# Patient Record
Sex: MALE | HISPANIC OR LATINO | Employment: FULL TIME | URBAN - METROPOLITAN AREA
[De-identification: names, ages, dates, MRNs, and addresses within clinical notes are randomized per-mention and may not be internally consistent; named-entity substitution may affect disease eponyms.]

---

## 2017-01-04 ENCOUNTER — OFFICE VISIT (OUTPATIENT)
Dept: MEDICAL GROUP | Facility: PHYSICIAN GROUP | Age: 62
End: 2017-01-04
Payer: COMMERCIAL

## 2017-01-04 VITALS
WEIGHT: 204.6 LBS | HEART RATE: 113 BPM | SYSTOLIC BLOOD PRESSURE: 112 MMHG | BODY MASS INDEX: 36.25 KG/M2 | RESPIRATION RATE: 14 BRPM | DIASTOLIC BLOOD PRESSURE: 78 MMHG | HEIGHT: 63 IN | TEMPERATURE: 97.7 F | OXYGEN SATURATION: 94 %

## 2017-01-04 DIAGNOSIS — Z79.4 TYPE 2 DIABETES MELLITUS WITH HYPEROSMOLAR COMA, WITH LONG-TERM CURRENT USE OF INSULIN (HCC): ICD-10-CM

## 2017-01-04 DIAGNOSIS — E78.00 PURE HYPERCHOLESTEROLEMIA: ICD-10-CM

## 2017-01-04 DIAGNOSIS — I15.2 HYPERTENSION ASSOCIATED WITH DIABETES (HCC): ICD-10-CM

## 2017-01-04 DIAGNOSIS — E11.01 TYPE 2 DIABETES MELLITUS WITH HYPEROSMOLAR COMA, WITH LONG-TERM CURRENT USE OF INSULIN (HCC): ICD-10-CM

## 2017-01-04 DIAGNOSIS — E66.9 OBESITY (BMI 35.0-39.9 WITHOUT COMORBIDITY): ICD-10-CM

## 2017-01-04 DIAGNOSIS — E11.59 HYPERTENSION ASSOCIATED WITH DIABETES (HCC): ICD-10-CM

## 2017-01-04 PROCEDURE — 99214 OFFICE O/P EST MOD 30 MIN: CPT | Performed by: NURSE PRACTITIONER

## 2017-01-04 RX ORDER — INSULIN GLARGINE 100 [IU]/ML
25 INJECTION, SOLUTION SUBCUTANEOUS EVERY EVENING
Qty: 10 ML | Refills: 2 | Status: SHIPPED | OUTPATIENT
Start: 2017-01-04 | End: 2017-04-15 | Stop reason: SDUPTHER

## 2017-01-04 NOTE — Clinical Note
[facility]  STORMY Navarrete  [address] [address]  [place] [address]  Fax: [phone] Authorization for Release/Disclosure of Protected Health Information   Name: WILLIS RAE : 1956 SSN: [id number]   Address: [address] [place] [address]  [place] [address] Phone:    [phone] (home)    I authorize the entity listed below to release/disclose the PHI below to [facility]/STORMY Navarrete   Provider or Entity Name: [facility]        Address   City, [facility]   Phone:      Fax: [phone]     Reason for request: continuity of care   Information to be released:    [ * ] LAST COLONOSCOPY, including any PATH REPORT [  ] LAST DEXA  [  ] LAST MAMMOGRAM  [  ] LAST PAP [  ] RETINA EXAM REPORT  [  ] IMMUNIZATION RECORDS  [  ] Release all info      [  ] Check here and initial the line next to each item to release ALL health information INCLUDING  _____ Care and treatment for drug and / or alcohol abuse  _____ HIV testing, infection status, or AIDS  _____ Genetic Testing    DATES OF SERVICE OR TIME PERIOD TO BE DISCLOSED: _Last Colonoscopy______  I understand and acknowledge that:  * This Authorization may be revoked at any time by you in writing, except if your health information has already been used or disclosed.  * Your health information that will be used or disclosed as a result of you signing this authorization could be re-disclosed by the recipient. If this occurs, your re-disclosed health information may no longer be protected by State or Federal laws.  * You may refuse to sign this Authorization. Your refusal will not affect your ability to obtain treatment.  * This Authorization becomes effective upon signing and will  on (date) __________. If no date is indicated, this Authorization will  one (1) year from the signature date.    Name: Willis Rae Date: 2017

## 2017-01-04 NOTE — MR AVS SNAPSHOT
"Jose Antonio Vann   2017 4:00 PM   Office Visit   MRN: [id number]    Department:  [facility]   Dept Phone:  [phone]    Description:  Male : 1956   Provider:  STORMY Navarrete           Reason for Visit     Follow-Up 3 month       Allergies as of 2017     No Known Allergies      You were diagnosed with     Type 2 diabetes mellitus with hyperosmolar coma, with long-term current use of insulin ([facility])   [9470453]       Obesity (BMI 35.0-39.9 without comorbidity) ([facility])   [120219]         Vital Signs     Blood Pressure Pulse Temperature Respirations Height Weight    112/78 mmHg 113 36.5 °C (97.7 °F) 14 1.6 m (5' 2.99\") 92.806 kg (204 lb 9.6 oz)    Body Mass Index Oxygen Saturation Smoking Status             36.25 kg/m2 94% Never Smoker          Basic Information     Date Of Birth Sex Race Ethnicity Preferred Language    1956 Male , Unable to Obtain  Origin (Latvian,Haitian,Monegasque,Burmese, etc) English      Your appointments     2017  5:00 PM   Established Patient with STORMY Navarrete   [address] [id number] [phone]           You will be receiving a confirmation call a few days before your appointment from our automated call confirmation system.              Problem List              ICD-10-CM Priority Class Noted - Resolved    Type II diabetes mellitus (HCC) E11.9   2016 - Present    Pure hypercholesterolemia E78.00   2016 - Present    Hypertension associated with diabetes (HCC) E11.59, I10   2016 - Present    Obesity (BMI 35.0-39.9 without comorbidity) ([facility]) E66.9   2017 - Present      Health Maintenance        Date Due Completion Dates    A1C SCREENING 1956 ---    DIABETES MONOFILAMTENT / LE EXAM 1954 ---    RETINAL SCREENING 1972 ---    FASTING LIPID PROFILE 1972 ---    URINE ACR / MICROALBUMIN 1972 ---    SERUM " CREATININE 1/6/1972 ---    IMM DTaP/Tdap/Td Vaccine (1 - Tdap) 1/6/1973 ---    IMM PNEUMOCOCCAL 19-64 (ADULT) MEDIUM RISK SERIES (1 of 1 - PPSV23) 1/6/1973 ---    IMM ZOSTER VACCINE 1/6/2014 ---    IMM INFLUENZA (1) 9/1/2016 ---            Current Immunizations     No immunizations on file.      Below and/or attached are the medications your provider expects you to take. Review all of your home medications and newly ordered medications with your provider and/or pharmacist. Follow medication instructions as directed by your provider and/or pharmacist. Please keep your medication list with you and share with your provider. Update the information when medications are discontinued, doses are changed, or new medications (including over-the-counter products) are added; and carry medication information at all times in the event of emergency situations     Allergies:  No Known Allergies          Medications  Valid as of: January 04, 2017 -  4:26 PM    Generic Name Brand Name Tablet Size Instructions for use    GlyBURIDE (Tab) DIABETA 5 MG Take 2 Tabs by mouth 2 times a day, with meals.        Insulin Aspart (Solution Pen-injector) NOVOLOG 100 UNIT/ML Inject  as instructed 3 times a day before meals.        Insulin Aspart (Solution Pen-injector) NOVOLOG 100 UNIT/ML 4-6 units according to sliding scale        Insulin Detemir (Solution Pen-injector) LEVEMIR 100 UNIT/ML Inject  as instructed every evening.        Insulin Detemir (Solution Pen-injector) LEVEMIR 100 UNIT/ML Inject 30 Units as instructed 2 times a day.        Insulin Glargine (Solution) LANTUS 100 UNIT/ML Inject 25 Units as instructed every evening.        Linagliptin-Metformin HCl (Tab) Linagliptin-Metformin HCl 2.5-1000 MG Take 1 tablet by mouth 2 Times a Day.        Lisinopril (Tab) PRINIVIL 10 MG Take 10 mg by mouth every day.        .                 Medicines prescribed today were sent to:     [id number] - [place], [place] - [address] [address]    [address] [place] [address]    Phone: [phone] Fax: [phone]    Open 24 Hours?: No      Medication refill instructions:       If your prescription bottle indicates you have medication refills left, it is not necessary to call your provider’s office. Please contact your pharmacy and they will refill your medication.    If your prescription bottle indicates you do not have any refills left, you may request refills at any time through one of the following ways: The online [employer] system (except Urgent Care), by calling your provider’s office, or by asking your pharmacy to contact your provider’s office with a refill request. Medication refills are processed only during regular business hours and may not be available until the next business day. Your provider may request additional information or to have a follow-up visit with you prior to refilling your medication.   *Please Note: Medication refills are assigned a new Rx number when refilled electronically. Your pharmacy may indicate that no refills were authorized even though a new prescription for the same medication is available at the pharmacy. Please request the medicine by name with the pharmacy before contacting your provider for a refill.        Your To Do List     Future Labs/Procedures Complete By Expires    COMP METABOLIC PANEL (For Serum Creatinine Topic, choose 1 only)  As directed 1/4/2018    HEMOGLOBIN A1C (For A1C Every 6 Months Topic)  As directed 1/4/2018    Comments:    HEMOGLOBIN A1C   [unfilled]    LIPID PROFILE  As directed 1/4/2018    MICROALBUMIN CREAT RATIO URINE  As directed 1/4/2018         [employer] Access Code: [email or web address]  Expires: 2/3/2017  4:26 PM    [employer]  A secure, online tool to manage your health information     The Mobile [employer]® is a secure, online tool that connects you to your personalized health information from the privacy of your home -- day or night - making it very easy for you to manage your  healthcare. Once the activation process is completed, you can even access your medical information using the [employer] ritika, which is available for free in the [employer] R[employer]ika store or Google Play store.     [employer] provides the following levels of access (as shown below):   My Chart Features   [facility] Doctor [facility]  Specialists [facility]  Urgent  Care Non-[facility]  Primary Care  Doctor   Email your healthcare team securely and privately 24/7 X X X    Manage appointments: schedule your next appointment; view details of past/upcoming appointments X      Request prescription refills. X      View recent personal medical records, including lab and immunizations X X X X   View health record, including health history, allergies, medications X X X X   Read reports about your outpatient visits, procedures, consult and ER notes X X X X   See your discharge summary, which is a recap of your hospital and/or ER visit that includes your diagnosis, lab results, and care plan. X X       How to register for [employer]:  1. Go to  [email or web address].  2. Click on the Sign Up Now box, which takes you to the New Member Sign Up page. You will need to provide the following information:  a. Enter your [employer] Access Code exactly as it appears at the top of this page. (You will not need to use this code after you’ve completed the sign-up process. If you do not sign up before the expiration date, you must request a new code.)   b. Enter your date of birth.   c. Enter your home email address.   d. Click Submit, and follow the next screen’s instructions.  3. Create a Enviroo ID. This will be your En[employer] login ID and cannot be changed, so think of one that is secure and easy to remember.  4. Create a [employer] password. You can change your password at any time.  5. Enter your Password Reset Question and Answer. This can be used at a later time if you forget your password.   6. Enter your e-mail address. This allows you to receive e-mail  notifications when new information is available in "[employer].  7. Click Sign Up. You can now view your health information.    For assistance activating your [employer] account, call [phone]

## 2017-01-05 NOTE — ASSESSMENT & PLAN NOTE
Chronic Condition: Patient had type 2 diabetes mellitus for 9 years. He checks blood sugar at home in the morning and at each meal and it averages more than 200. He is currently not taking Levemir because it was too expensive. He is only managing his diabetes with NovoLog and Jentadueto. He denies any tremors, anxiety, weakness, headache, dizziness, blurry vision, fatigue, irritability or palpitations. Patient denies any drowsiness, dry skin, polyphagia or polydipsia, polyuria or nausea.

## 2017-01-05 NOTE — PROGRESS NOTES
Subjective:     Chief Complaint   Patient presents with   • Follow-Up     3 month        HPI:  Jose Antonio Vann is a 60 y.o. male here today to discuss the following:    Hypertension associated with diabetes (HCC)  Chronic condition: Patient has been treated for hypertension for approximately 8 years.He  is currently taking lisinopril 10 mg daily without any side effects and blood pressures well controlled. He denies any chest pain, diaphoresis, shortness of breath, headaches, dizziness or blurred vision.         Obesity (BMI 35.0-39.9 without comorbidity) (HCC)  Patient's obesity with a BMI of 36.25. We discussed making dietary modifications to help him decrease his weight and improve diabetes.    Pure hypercholesterolemia  Chronic Condition: Patient has hyperlipidemia and is currently taking not taking medication. He states that he was told to stop it by previous doctor. We do not have labs available to assess his values, and labs will be ordered to be reviewed at his appointment in 4 weeks. He denies any chest pain, diaphoresis, shortness of breath, headaches, dizziness, blurred vision or myalgias.     Type II diabetes mellitus (HCC)  Chronic Condition: Patient had type 2 diabetes mellitus for 9 years. He checks blood sugar at home in the morning and at each meal and it averages more than 200. He is currently not taking Levemir because it was too expensive. He is only managing his diabetes with NovoLog and Jentadueto. He denies any tremors, anxiety, weakness, headache, dizziness, blurry vision, fatigue, irritability or palpitations. Patient denies any drowsiness, dry skin, polyphagia or polydipsia, polyuria or nausea.                Current medicines (including changes today)  Current Outpatient Prescriptions   Medication Sig Dispense Refill   • insulin glargine (LANTUS) 100 UNIT/ML Solution Inject 25 Units as instructed every evening. 10 mL 2   • insulin detemir (LEVEMIR FLEXTOUCH) 100 UNIT/ML Solution  "Pen-injector injection Inject  as instructed every evening.     • NOVOLOG, insulin aspart, (NOVOLOG FLEXPEN) 100 UNIT/ML Solution Pen-injector injection Inject  as instructed 3 times a day before meals.     • lisinopril (PRINIVIL) 10 MG Tab Take 10 mg by mouth every day.     • insulin detemir (LEVEMIR FLEXTOUCH) 100 UNIT/ML Solution Pen-injector injection Inject 30 Units as instructed 2 times a day. 15 PEN 1   • NOVOLOG, insulin aspart, (NOVOLOG FLEXPEN) 100 UNIT/ML Solution Pen-injector injection 4-6 units according to sliding scale 15 PEN 1   • Linagliptin-Metformin HCl (JENTADUETO) 2.5-1000 MG Tab Take 1 tablet by mouth 2 Times a Day. 60 Tab 2   • glyBURIDE (DIABETA) 5 MG Tab Take 2 Tabs by mouth 2 times a day, with meals. 60 Tab 2     No current facility-administered medications for this visit.       He  has no past medical history on file.    ROS   Review of Systems   Constitutional: Negative for fever, chills, weight loss and malaise/fatigue.   HENT: Negative for ear pain, nosebleeds, congestion, sore throat and neck pain.    Respiratory: Negative for cough, sputum production, shortness of breath and wheezing.    Cardiovascular: Negative for chest pain, palpitations,  and leg swelling.   Gastrointestinal: Negative for heartburn, nausea, vomiting, diarrhea and abdominal pain.   Neurological: Negative for dizziness, tingling, tremors, sensory change, focal weakness and headaches.   Psychiatric/Behavioral: Negative for depression, anxiety, suicidal ideas, insomnia and memory loss.    All other systems reviewed and are negative except as in HPI.     Objective:   Physical Exam:  Blood pressure 112/78, pulse 113, temperature 36.5 °C (97.7 °F), resp. rate 14, height 1.6 m (5' 2.99\"), weight 92.806 kg (204 lb 9.6 oz), SpO2 94 %. Body mass index is 36.25 kg/(m^2).  Physical Exam:  Alert, oriented in no acute distress.  Eye contact is good, speech goal directed, affect calm  HEENT: conjunctiva non-injected, sclera " non-icteric.  Pinna normal.   Neck No adenopathy or masses in the neck or supraclavicular regions.  Lungs: clear to auscultation bilaterally with good excursion.  CV: regular rate and rhythm.   Abdomen: soft, nontender, No CVAT. Normal bowel sounds.  Ext: no edema, color normal, vascularity normal, temperature normal  MS: Normal gait and station    Assessment and Plan:   The following treatment plan was discussed   1. Type 2 diabetes mellitus with hyperosmolar coma, with long-term current use of insulin ([facility])  insulin glargine (LANTUS) 100 UNIT/ML Solution    HEMOGLOBIN A1C (For A1C Every 6 Months Topic)    LIPID PROFILE    COMP METABOLIC PANEL (For Serum Creatinine Topic, choose 1 only)    MICROALBUMIN CREAT RATIO URINE    Diabetic Monofilament Lower Extremity Exam   2. Obesity (BMI 35.0-39.9 without comorbidity) ([facility])  Patient identified as having weight management issue.  Appropriate orders and counseling given.   3. Hypertension associated with diabetes ([facility])     4. Pure hypercholesterolemia      insulin was changed to Lantus and patient was advised to take 25 units at bedtime. He was asked to bring a copy of his current sliding scale Office visit. Labs were ordered and we will reevaluate in 4 weeks.    Followup: Return in about 4 weeks (around 2/1/2017) for DM.   Please note that this dictation was created using voice recognition software. I have made every reasonable attempt to correct obvious errors, but I expect that there are errors of grammar and possibly content that I did not discover before finalizing the note.

## 2017-01-05 NOTE — ASSESSMENT & PLAN NOTE
Patient's obesity with a BMI of 36.25. We discussed making dietary modifications to help him decrease his weight and improve diabetes.

## 2017-01-05 NOTE — ASSESSMENT & PLAN NOTE
Chronic Condition: Patient has hyperlipidemia and is currently taking not taking medication. He states that he was told to stop it by previous doctor. We do not have labs available to assess his values, and labs will be ordered to be reviewed at his appointment in 4 weeks. He denies any chest pain, diaphoresis, shortness of breath, headaches, dizziness, blurred vision or myalgias.

## 2017-01-05 NOTE — ASSESSMENT & PLAN NOTE
Chronic condition: Patient has been treated for hypertension for approximately 8 years.He  is currently taking lisinopril 10 mg daily without any side effects and blood pressures well controlled. He denies any chest pain, diaphoresis, shortness of breath, headaches, dizziness or blurred vision.

## 2017-01-11 ENCOUNTER — TELEPHONE (OUTPATIENT)
Dept: MEDICAL GROUP | Facility: PHYSICIAN GROUP | Age: 62
End: 2017-01-11

## 2017-01-11 NOTE — TELEPHONE ENCOUNTER
1. Caller Name: Tonia (dtr)                                         Call Back Number: [phone] (home)         Patient approves a detailed voicemail message: N\A    Pt's daughter said pt was put on lantus but was told to let you know if his insurance wouldn't pay and it would be switched to something else.  She is requesting switch as insurance has denied.  Thank you

## 2017-01-13 NOTE — TELEPHONE ENCOUNTER
1. Caller Name: Shereen/[facility]                                         Call Back Number: [phone]      Patient approves a detailed voicemail message: N\A    Shereen called back and stated the [facility] pharmacy had a glitch in their system that they are trying to fix and that lantus is actually covered.   They are working to fix the glitch

## 2017-01-21 ENCOUNTER — HOSPITAL ENCOUNTER (OUTPATIENT)
Dept: LAB | Facility: MEDICAL CENTER | Age: 62
End: 2017-01-21
Attending: NURSE PRACTITIONER
Payer: COMMERCIAL

## 2017-01-28 ENCOUNTER — HOSPITAL ENCOUNTER (OUTPATIENT)
Dept: LAB | Facility: MEDICAL CENTER | Age: 62
End: 2017-01-28
Attending: NURSE PRACTITIONER
Payer: COMMERCIAL

## 2017-01-28 DIAGNOSIS — E11.00 TYPE 2 DIABETES MELLITUS WITH HYPEROSMOLARITY WITHOUT COMA, WITH LONG-TERM CURRENT USE OF INSULIN (HCC): ICD-10-CM

## 2017-01-28 DIAGNOSIS — Z79.4 TYPE 2 DIABETES MELLITUS WITH HYPEROSMOLARITY WITHOUT COMA, WITH LONG-TERM CURRENT USE OF INSULIN (HCC): ICD-10-CM

## 2017-01-28 LAB
ALBUMIN SERPL BCP-MCNC: 4.4 G/DL (ref 3.2–4.9)
ALBUMIN/GLOB SERPL: 1.5 G/DL
ALP SERPL-CCNC: 77 U/L (ref 30–99)
ALT SERPL-CCNC: 81 U/L (ref 2–50)
ANION GAP SERPL CALC-SCNC: 6 MMOL/L (ref 0–11.9)
AST SERPL-CCNC: 49 U/L (ref 12–45)
BILIRUB SERPL-MCNC: 0.7 MG/DL (ref 0.1–1.5)
BUN SERPL-MCNC: 12 MG/DL (ref 8–22)
CALCIUM SERPL-MCNC: 9.3 MG/DL (ref 8.5–10.5)
CHLORIDE SERPL-SCNC: 103 MMOL/L (ref 96–112)
CHOLEST SERPL-MCNC: 242 MG/DL (ref 100–199)
CO2 SERPL-SCNC: 28 MMOL/L (ref 20–33)
CREAT SERPL-MCNC: 0.84 MG/DL (ref 0.5–1.4)
CREAT UR-MCNC: 198.3 MG/DL
EST. AVERAGE GLUCOSE BLD GHB EST-MCNC: 232 MG/DL
GLOBULIN SER CALC-MCNC: 2.9 G/DL (ref 1.9–3.5)
GLUCOSE SERPL-MCNC: 160 MG/DL (ref 65–99)
HBA1C MFR BLD: 9.7 % (ref 0–5.6)
HDLC SERPL-MCNC: 40 MG/DL
LDLC SERPL CALC-MCNC: 153 MG/DL
MICROALBUMIN UR-MCNC: 23.1 MG/DL
MICROALBUMIN/CREAT UR: 116 MG/G (ref 0–30)
POTASSIUM SERPL-SCNC: 3.9 MMOL/L (ref 3.6–5.5)
PROT SERPL-MCNC: 7.3 G/DL (ref 6–8.2)
SODIUM SERPL-SCNC: 137 MMOL/L (ref 135–145)
TRIGL SERPL-MCNC: 245 MG/DL (ref 0–149)

## 2017-01-28 PROCEDURE — 80053 COMPREHEN METABOLIC PANEL: CPT

## 2017-01-28 PROCEDURE — 82570 ASSAY OF URINE CREATININE: CPT

## 2017-01-28 PROCEDURE — 36415 COLL VENOUS BLD VENIPUNCTURE: CPT

## 2017-01-28 PROCEDURE — 83036 HEMOGLOBIN GLYCOSYLATED A1C: CPT

## 2017-01-28 PROCEDURE — 80061 LIPID PANEL: CPT

## 2017-01-28 PROCEDURE — 82043 UR ALBUMIN QUANTITATIVE: CPT

## 2017-02-01 ENCOUNTER — OFFICE VISIT (OUTPATIENT)
Dept: MEDICAL GROUP | Facility: PHYSICIAN GROUP | Age: 62
End: 2017-02-01
Payer: COMMERCIAL

## 2017-02-01 VITALS
SYSTOLIC BLOOD PRESSURE: 130 MMHG | WEIGHT: 202.6 LBS | TEMPERATURE: 98.4 F | OXYGEN SATURATION: 97 % | BODY MASS INDEX: 35.9 KG/M2 | HEIGHT: 63 IN | DIASTOLIC BLOOD PRESSURE: 84 MMHG | HEART RATE: 81 BPM | RESPIRATION RATE: 16 BRPM

## 2017-02-01 DIAGNOSIS — E11.00 TYPE 2 DIABETES MELLITUS WITH HYPEROSMOLARITY WITHOUT COMA, WITHOUT LONG-TERM CURRENT USE OF INSULIN (HCC): ICD-10-CM

## 2017-02-01 PROCEDURE — 99214 OFFICE O/P EST MOD 30 MIN: CPT | Performed by: NURSE PRACTITIONER

## 2017-02-01 RX ORDER — GLYBURIDE 5 MG/1
10 TABLET ORAL 2 TIMES DAILY WITH MEALS
Qty: 180 TAB | Refills: 2 | Status: SHIPPED | OUTPATIENT
Start: 2017-02-01 | End: 2017-05-23 | Stop reason: SDUPTHER

## 2017-02-02 ENCOUNTER — TELEPHONE (OUTPATIENT)
Dept: MEDICAL GROUP | Facility: PHYSICIAN GROUP | Age: 62
End: 2017-02-02

## 2017-02-02 NOTE — ASSESSMENT & PLAN NOTE
Patient is here to follow-up on diabetes. He is currently taking novolog 12 bid, lantus 25, Glyburide 5mg twice, and Jentadueto  2.5 - 1000 BID. He takes his blood sugar at home at it is above 200.   He denies any tremors, anxiety, weakness, headache, dizziness, blurry vision, fatigue, irritability or palpitations. Patient denies any drowsiness, dry skin, polyphagia or polydipsia, polyuria or nausea.

## 2017-02-02 NOTE — PATIENT INSTRUCTIONS
Glyburide tablets  ¿Qué es marshal medicamento?  La GLIBURIDA ayuda a tratar la diabetes tipo 2. El tratamiento se combina con blaine dieta y ejercicios. Marshal medicamento ayuda a john cuerpo a usar la insulina de manera más eficiente.  Marshal medicamento puede ser utilizado para otros usos; si tiene alguna pregunta consulte con john proveedor de atención médica o con john farmacéutico.  MARCAS COMERCIALES DISPONIBLES: Diabeta, Glycron, Glynase PresTab, Micronase  ¿Qué le tasneem informar a mi profesional de la german antes de flaquita marshal medicamento?  Necesita saber si usted presenta alguno de los siguientes problemas o situaciones:  -cetoacidosis diabética  -deficiencia de glucosa -6- fosfato deshidrogenasa  -enfermedad cardiaca  -enfermedad renal  -enfermedad hepática  -infección o lesión severa  -enfermedad tiroidea  -blaine reacción alérgica o inusual a la gliburida, sulfonamidas, a otros medicamentos, alimentos, colorantes o conservantes  -si está embarazada o buscando quedar embarazada  -si está amamantando a un bebé  ¿Cómo tasneem utilizar marshal medicamento?  [place] marshal medicamento por vía oral con un vaso de agua. Siga las instrucciones de la etiqueta del medicamento. Si darius marshal medicamento blaine vez al día, tómelo con el desayuno o la primera comida del día. [place] john medicamento a la misma hora todos los días. No lo tome con blaine frecuencia mayor a la indicada.  Hable con john pediatra para informarse acerca del uso de marshal medicamento en niños. Puede requerir atención especial.  Los pacientes de más de 65 años de edad pueden tener reacciones más duc y necesitar blaine dosis clementine.  Sobredosis: Póngase en contacto inmediatamente con un centro toxicológico o blaine mahi de urgencia si usted rush que haya tomado demasiado medicamento.  ATENCIÓN: Marshal medicamento es solo para usted. No comparta marshal medicamento con nadie.  ¿Qué sucede si me olvido de blaine dosis?  Si olvida blaine dosis, tómela lo antes posible. Si es sue la hora de la próxima  dosis, tome sólo steve dosis. No tome dosis adicionales o dobles.  ¿Qué puede interactuar con marshal medicamento?  -bosentano  -cloranfenicol  -cisapride  -claritromicina  -medicamentos para las infecciones micóticas y por levadura  -metoclopramida  -probenecid  -rifampicina  -warfarina  Muchos medicamentos pueden aumentar o reducir el nivel de azúcar en la neema, tales parvez:  -bebidas alcohólicas  -inhibidores de la enzima convertidora de angiotensina, parvez enalapril, captopril y lisinopril  -aspirina y medicamentos tipo aspirina  -cloranfenicol  -cromo  -hormonas femeninas, parvez estrógenos, progestinas o píldoras anticonceptivas  -fluoxetina  -medicamentos cardiacos, parvez disopiramida  -isoniazida  -hormonas masculinas o esteroides anabólicos  -medicamentos denominados inhibidores de la MAO, tales parvez Nardil, Parnate, Marplan, Eldepryl  -medicamentos para alergias, asma, resfríos o tos  -medicamentos para problemas mentales  -medicamentos para bajar de peso  -niacina  -los REGGIE, medicamentos para el dolor o inflamación, parvez ibuprofeno o naproxeno  -pentamidina  -fenitoína  -probenecid  -antibióticos quinolónicos, parvez ciprofloxacina, levofloxacino, ofloxacino  -algunos suplementos dietéticos a base de hierbas  -medicamentos esteroideos, parvez la prednisona o la cortisona  -hormonas tiroideas  -diuréticos  Puede ser que esta lista no menciona todas las posibles interacciones. Informe a john profesional de la german de todos los productos a base de hierbas, medicamentos de venta pepe o suplementos nutritivos que esté tomando. Si usted fuma, consume bebidas alcohólicas o si utiliza drogas ilegales, indíqueselo también a john profesional de la german. Algunas sustancias pueden interactuar con john medicamento.  ¿A qué tasneem estar atento al usar marshal medicamento?  Visite a john médico o a john profesional de la german para chequear john evolución periódicamente. Aprenda a controlar periódicamente el nivel de azúcar en la neema.  Consulte a john médico o a john profesional de la german si john nivel de azúcar en la neema es alto, gage vez sea necesario modificar la dosis de john medicamento. Si está enfermo o haciendo mucho más ejercicio que el habitual, puede ser necesario modificar la dosis de john medicamento. No se salte comidas. Consulte a john médico o a john profesional de la german si debe evitar el consumo de alcohol. Si tiene síntomas leves de bajo nivel de azúcar en la neema, coma o david algo que contenga azúcar inmediatamente y comuníquese con john médico o con john profesional de la german. Asegúrese de que los miembros de jhon zoila sepan que se puede ahogar si come o marco mientras tiene síntomas graves de bajo nivel de azúcar en la neema, tales parvez convulsiones o pérdida del conocimiento. Deben obtener ayuda médica inmediatamente.  Marshal medicamento puede aumentar la sensibilidad al sol. Manténgase fuera rachel solar. Si no lo puede evitar utilice ropa protectora y crema de protección solar. No utilice lámparas scarlett, brenda scarlett ni cabinas scarlett.  Use blaine pulsera o myers de identificación médica que indique que tiene diabetes y lleve blaine tarjeta que indique todos tabby medicamentos.  ¿Qué efectos secundarios puedo tener al utilizar marshal medicamento?  Efectos secundarios que debe informar a john médico o a john profesional de la german tan pronto parvez sea posible:  -reacciones alérgicas parvez erupción cutánea, picazón o urticarias, hinchazón de la mary, labios o lengua  -problemas respiratorios  -orina de color amarillo oscuro  -fiebre, escalofríos, dolor de garganta  -bajo nivel de glucosa en la neema (pregunte a john médico o a john profesional de la german por blaine lista de estos síntomas)  -sangrado o magulladuras inusuales  -color amarillento de los ojos o la piel  Efectos secundarios que, por lo general, no requieren atención médica (debe informarlos a john médico o a john profesional de la german si persisten o si son molestos):  -diarrea  -dolor  de chula  -acidez de estómago  -náuseas, vómito  -molestias estomacales  Puede ser que esta lista no menciona todos los posibles efectos secundarios. Comuníquese a john médico por asesoramiento médico sobre los efectos secundarios. Myron puede informar los efectos secundarios a la FDA por teléfono al [phone]-[phone].  ¿Dónde tasneem guardar mi medicina?  Manténgala fuera del alcance de los niños.  Guárdela a temperatura ambiente, entre 15 y 30 grados C (59 y 86 grados F). Deseche todo el medicamento que no haya utilizado, después de la fecha de vencimiento.  ATENCIÓN: Tonya folleto es un resumen. Puede ser que no cubra toda la posible información. Si usted tiene preguntas acerca de esta medicina, consulte con john médico, john farmacéutico o john profesional de la german.  © 2014, [employer]. (9/3/2009 4:42:59 PM)

## 2017-02-02 NOTE — TELEPHONE ENCOUNTER
----- Message from Shelbie Samuel M.D. sent at 2/2/2017  8:24 AM PST -----  Patient needs follow up on labs in the next 2 weeks please.

## 2017-02-03 NOTE — PROGRESS NOTES
Subjective:     Chief Complaint   Patient presents with   • Follow-Up     4 weeks        HPI:  Jose Antonio Vann is a 61 y.o. male here today to discuss the following:    Type II diabetes mellitus (CMS-[facility])  Patient is here to follow-up on diabetes. He is currently taking novolog 12 bid, lantus 25, Glyburide 5mg twice, and Jentadueto  2.5 - 1000 BID. He takes his blood sugar at home at it is above 200.   He denies any tremors, anxiety, weakness, headache, dizziness, blurry vision, fatigue, irritability or palpitations. Patient denies any drowsiness, dry skin, polyphagia or polydipsia, polyuria or nausea.                    Current medicines (including changes today)  Current Outpatient Prescriptions   Medication Sig Dispense Refill   • glyBURIDE (DIABETA) 5 MG Tab Take 2 Tabs by mouth 2 times a day, with meals. 180 Tab 2   • insulin glargine (LANTUS) 100 UNIT/ML Solution Inject 25 Units as instructed every evening. 10 mL 2   • NOVOLOG, insulin aspart, (NOVOLOG FLEXPEN) 100 UNIT/ML Solution Pen-injector injection Inject  as instructed 3 times a day before meals.     • lisinopril (PRINIVIL) 10 MG Tab Take 10 mg by mouth every day.       No current facility-administered medications for this visit.       He  has no past medical history on file.    ROS   Review of Systems   Constitutional: Negative for fever, chills, weight loss and malaise/fatigue.   HENT: Negative for ear pain, nosebleeds, congestion, sore throat and neck pain.    Respiratory: Negative for cough, sputum production, shortness of breath and wheezing.    Cardiovascular: Negative for chest pain, palpitations,  and leg swelling.   Gastrointestinal: Negative for heartburn, nausea, vomiting, diarrhea and abdominal pain.   Neurological: Negative for dizziness, tingling, tremors, sensory change, focal weakness and headaches.   Psychiatric/Behavioral: Negative for depression, anxiety, suicidal ideas, insomnia and memory loss.    All other systems reviewed and are  "negative except as in HPI.     Objective:   Physical Exam:  Blood pressure 130/84, pulse 81, temperature 36.9 °C (98.4 °F), resp. rate 16, height 1.6 m (5' 2.99\"), weight 91.899 kg (202 lb 9.6 oz), SpO2 97 %. Body mass index is 35.9 kg/(m^2).   Physical Exam:  Alert, oriented in no acute distress.  Eye contact is good, speech goal directed, affect calm  HEENT: conjunctiva non-injected, sclera non-icteric.  Pinna normal.   Neck No adenopathy or masses in the neck or supraclavicular regions.  Lungs: clear to auscultation bilaterally with good excursion.  CV: regular rate and rhythm.   Abdomen: soft, nontender, No CVAT. Normal bowel sounds.  Ext: no edema, color normal, vascularity normal, temperature normal  MS: Normal gait and station    Assessment and Plan:   The following treatment plan was discussed   1. Type 2 diabetes mellitus with hyperosmolarity without coma, without long-term current use of insulin (HCC)  HEMOGLOBIN A1C (For A1C Every 6 Months Topic)    LIPID PROFILE    COMP METABOLIC PANEL (For Serum Creatinine Topic, choose 1 only)    MICROALBUMIN CREAT RATIO URINE    glyBURIDE (DIABETA) 5 MG Tab    Increase Lantus by 2 units to 27 at bedtime. If blood sugar remains above 200 may increase 2 units to 29 units at bedtime.        Followup: Return in about 3 months (around 5/1/2017) for DM.   Please note that this dictation was created using voice recognition software. I have made every reasonable attempt to correct obvious errors, but I expect that there are errors of grammar and possibly content that I did not discover before finalizing the note.             "

## 2017-03-20 ENCOUNTER — OFFICE VISIT (OUTPATIENT)
Dept: MEDICAL GROUP | Facility: PHYSICIAN GROUP | Age: 62
End: 2017-03-20
Payer: COMMERCIAL

## 2017-03-20 VITALS
HEIGHT: 63 IN | WEIGHT: 204 LBS | TEMPERATURE: 97.6 F | RESPIRATION RATE: 14 BRPM | BODY MASS INDEX: 36.14 KG/M2 | SYSTOLIC BLOOD PRESSURE: 106 MMHG | DIASTOLIC BLOOD PRESSURE: 70 MMHG | OXYGEN SATURATION: 94 % | HEART RATE: 94 BPM

## 2017-03-20 DIAGNOSIS — J20.9 ACUTE BRONCHITIS DUE TO INFECTION: Primary | ICD-10-CM

## 2017-03-20 PROCEDURE — 99214 OFFICE O/P EST MOD 30 MIN: CPT | Performed by: NURSE PRACTITIONER

## 2017-03-20 RX ORDER — AZITHROMYCIN 250 MG/1
TABLET, FILM COATED ORAL
Qty: 6 TAB | Refills: 0 | Status: SHIPPED | OUTPATIENT
Start: 2017-03-20 | End: 2017-05-23

## 2017-03-20 NOTE — MR AVS SNAPSHOT
"        Jose Antonio Edwar   3/20/2017 9:00 AM   Office Visit   MRN: [id number]    Department:  [facility]   Dept Phone:  [phone]    Description:  Male : 1956   Provider:  TAY Puckett           Reason for Visit     Cough x 2 wks      Allergies as of 3/20/2017     No Known Allergies      You were diagnosed with     Acute bronchitis due to infection   [[id number]]  -  Primary       Vital Signs     Blood Pressure Pulse Temperature Respirations Height Weight    106/70 mmHg 94 36.4 °C (97.6 °F) 14 1.6 m (5' 2.99\") 92.534 kg (204 lb)    Body Mass Index Oxygen Saturation Smoking Status             36.15 kg/m2 94% Never Smoker          Basic Information     Date Of Birth Sex Race Ethnicity Preferred Language    1956 Male , Unable to Obtain  Origin ([place] citizen,[place]se,English,Roberto, etc) English      Your appointments     May 18, 2017  5:00 PM   Established Patient with STORMY Navarrete   [facility])    [address] [place] [id number] [phone]           You will be receiving a confirmation call a few days before your appointment from our automated call confirmation system.            2017  8:40 AM   New Patient with Samm Grace M.D.   [facility]    [address] [facility], [address]  [facility] [address] [phone]           Please bring Photo ID, Insurance Cards, All Medication Bottles and copies of any legal documents (such as Living Will, Power of ) If speaking a language besides English please bring an adult . Please arrive 30 minutes prior for check in and registration. You will be receiving a confirmation call a few days before your appointment from our automated call confirmation system.              Problem List              ICD-10-CM Priority Class Noted - Resolved    Type II diabetes mellitus (CMS-HCC) E11.9   2016 - Present    Pure " hypercholesterolemia E78.00   12/6/2016 - Present    Hypertension associated with diabetes (CMS-[facility]) E11.59, I10   12/6/2016 - Present    Obesity (BMI 35.0-39.9 without comorbidity) ([facility]) E66.9   1/4/2017 - Present      Health Maintenance        Date Due Completion Dates    RETINAL SCREENING 1/6/1972 ---    IMM DTaP/Tdap/Td Vaccine (1 - Tdap) 1/6/1973 ---    IMM PNEUMOCOCCAL 19-64 (ADULT) MEDIUM RISK SERIES (1 of 1 - PPSV23) 1/6/1973 ---    IMM ZOSTER VACCINE 1/6/2014 ---    IMM INFLUENZA (1) 9/1/2016 ---    A1C SCREENING 7/28/2017 1/28/2017    DIABETES MONOFILAMENT / LE EXAM 1/4/2018 1/4/2017    FASTING LIPID PROFILE 1/28/2018 1/28/2017    URINE ACR / MICROALBUMIN 1/28/2018 1/28/2017    SERUM CREATININE 1/28/2018 1/28/2017            Current Immunizations     No immunizations on file.      Below and/or attached are the medications your provider expects you to take. Review all of your home medications and newly ordered medications with your provider and/or pharmacist. Follow medication instructions as directed by your provider and/or pharmacist. Please keep your medication list with you and share with your provider. Update the information when medications are discontinued, doses are changed, or new medications (including over-the-counter products) are added; and carry medication information at all times in the event of emergency situations     Allergies:  No Known Allergies          Medications  Valid as of: March 20, 2017 -  9:10 AM    Generic Name Brand Name Tablet Size Instructions for use    Azithromycin (Tab) ZITHROMAX 250 MG Take 500mg day 1, then 250mg days 2-5.        GlyBURIDE (Tab) DIABETA 5 MG Take 2 Tabs by mouth 2 times a day, with meals.        Insulin Aspart (Solution Pen-injector) NOVOLOG 100 UNIT/ML Inject  as instructed 3 times a day before meals.        Insulin Glargine (Solution) LANTUS 100 UNIT/ML Inject 25 Units as instructed every evening.        Lisinopril (Tab) PRINIVIL 10 MG Take 10 mg by  mouth every day.        .                 Medicines prescribed today were sent to:     [id number] - [place], [place] - [address]    [address] [place] [address] [place] [address]    Phone: [phone] Fax: [phone]    Open 24 Hours?: No      Medication refill instructions:       If your prescription bottle indicates you have medication refills left, it is not necessary to call your provider’s office. Please contact your pharmacy and they will refill your medication.    If your prescription bottle indicates you do not have any refills left, you may request refills at any time through one of the following ways: The online [employer] system (except Urgent Care), by calling your provider’s office, or by asking your pharmacy to contact your provider’s office with a refill request. Medication refills are processed only during regular business hours and may not be available until the next business day. Your provider may request additional information or to have a follow-up visit with you prior to refilling your medication.   *Please Note: Medication refills are assigned a new Rx number when refilled electronically. Your pharmacy may indicate that no refills were authorized even though a new prescription for the same medication is available at the pharmacy. Please request the medicine by name with the pharmacy before contacting your provider for a refill.           [employer] Access Code: [email or web address]  Expires: 4/19/2017  9:10 AM    [employer]  A secure, online tool to manage your health information     [employer]’s [employer]® is a secure, online tool that connects you to your personalized health information from the privacy of your home -- day or night - making it very easy for you to manage your healthcare. Once the activation process is completed, you can even access your medical information using the [employer] ritika, which is available for free in the Apple Ritika store or Google Play store.     [employer] provides the following  levels of access (as shown below):   My Chart Features   [facility] Doctor [facility]  Specialists [facility]  Urgent  Care Non-[facility]  Primary Care  Doctor   Email your healthcare team securely and privately 24/7 X X X    Manage appointments: schedule your next appointment; view details of past/upcoming appointments X      Request prescription refills. X      View recent personal medical records, including lab and immunizations X X X X   View health record, including health history, allergies, medications X X X X   Read reports about your outpatient visits, procedures, consult and ER notes X X X X   See your discharge summary, which is a recap of your hospital and/or ER visit that includes your diagnosis, lab results, and care plan. X X       How to register for [facility]:  1. Go to  [email or web address].  2. Click on the Sign Up Now box, which takes you to the New Member Sign Up page. You will need to provide the following information:  a. Enter your [facility] Access Code exactly as it appears at the top of this page. (You will not need to use this code after you’ve completed the sign-up process. If you do not sign up before the expiration date, you must request a new code.)   b. Enter your date of birth.   c. Enter your home email address.   d. Click Submit, and follow the next screen’s instructions.  3. Create a [facility] ID. This will be your [facility] login ID and cannot be changed, so think of one that is secure and easy to remember.  4. Create a [facility] password. You can change your password at any time.  5. Enter your Password Reset Question and Answer. This can be used at a later time if you forget your password.   6. Enter your e-mail address. This allows you to receive e-mail notifications when new information is available in [facility].  7. Click Sign Up. You can now view your health information.    For assistance activating your [facility] account, call [phone]

## 2017-03-20 NOTE — PROGRESS NOTES
Chief Complaint   Patient presents with   • Cough     x 2 wks       HISTORY OF PRESENT ILLNESS: Patient is a 61 y.o. male established patient who presents today with his wife to discuss the following new problem.  He is a patient of Georgia Sanchez, this is his first visit with myself.    1. Acute bronchitis due to infection  Patient has been experiencing a worsening cough over the past couple of weeks.  He reports productive cough with green sputum.  He is also complaining of lack of energy.  He denies sick contacts.  He has been trying over-the-counter decongestants and fever reducers with only mild, temporary relief.  He states that the cough is worse at night and is interfering with his sleep.      Allergies:Review of patient's allergies indicates no known allergies.    Current Outpatient Prescriptions Ordered in [facility]   Medication Sig Dispense Refill   • azithromycin (ZITHROMAX) 250 MG Tab Take 500mg day 1, then 250mg days 2-5. 6 Tab 0   • glyBURIDE (DIABETA) 5 MG Tab Take 2 Tabs by mouth 2 times a day, with meals. 180 Tab 2   • insulin glargine (LANTUS) 100 UNIT/ML Solution Inject 25 Units as instructed every evening. 10 mL 2   • NOVOLOG, insulin aspart, (NOVOLOG FLEXPEN) 100 UNIT/ML Solution Pen-injector injection Inject  as instructed 3 times a day before meals.     • lisinopril (PRINIVIL) 10 MG Tab Take 10 mg by mouth every day.       No current [facility]-ordered facility-administered medications on file.       Social History   Substance Use Topics   • Smoking status: Never Smoker    • Smokeless tobacco: Never Used   • Alcohol Use: Not on file       No family status information on file.       ROS: see above    Review of Systems   Constitutional: Negative for fever, chills, weight loss and malaise/fatigue.   HENT: Negative for ear pain, nosebleeds, congestion, sore throat and neck pain.    Eyes: Negative for blurred vision.   Respiratory: Negative for cough, sputum production, shortness of breath and wheezing.   "  Cardiovascular: Negative for chest pain, palpitations, orthopnea and leg swelling.   Gastrointestinal: Negative for heartburn, nausea, vomiting and abdominal pain.   Genitourinary: Negative for dysuria, urgency and frequency.   Musculoskeletal: Negative for myalgias, back pain and joint pain.   Skin: Negative for rash and itching.   Neurological: Negative for dizziness, tingling, tremors, sensory change, focal weakness and headaches.   Endo/Heme/Allergies: Does not bruise/bleed easily.   Psychiatric/Behavioral: Negative for depression, suicidal ideas and memory loss.  The patient is not nervous/anxious and does not have insomnia.        Exam:  Blood pressure 106/70, pulse 94, temperature 36.4 °C (97.6 °F), resp. rate 14, height 1.6 m (5' 2.99\"), weight 92.534 kg (204 lb), SpO2 94 %.  General:  Well nourished, well developed male in NAD  Head is grossly normal.  Neck: Thyroid is not enlarged.  Pulmonary: Normal effort. Diminished bases with rhonci in the lower lobes.  Cardiovascular: Regular rate and rhythm without murmur.   Extremities: no clubbing, cyanosis, or edema.  Psych:  Mood and affect are normal.  Answering questions appropriately with good eye contact.      Please note that this dictation was created using voice recognition software. I have made every reasonable attempt to correct obvious errors, but I expect that there are errors of grammar and possibly content that I did not discover before finalizing the note.    Assessment/Plan:    1. Acute bronchitis due to infection  azithromycin (ZITHROMAX) 250 MG Tab        1.  Treat empirically for acute bronchitis  2.  Encouraged the use of OTC remedies for symptom relief until resolved  3.  RTC if symptoms worsen or fail to improve.  4.  Follow-up with PCP and endocrinology in regards to poorly controlled diabetes.      "

## 2017-03-21 NOTE — TELEPHONE ENCOUNTER
Was the patient seen in the last year in this department? Yes     Does patient have an active prescription for medications requested? No     Received Request Via: Pharmacy      Pt met protocol?: Yes, OV yesterday, A1c checked in January. Med d/c'd last month.

## 2017-03-22 DIAGNOSIS — Z79.4 TYPE 2 DIABETES MELLITUS WITH HYPEROSMOLARITY WITHOUT COMA, WITH LONG-TERM CURRENT USE OF INSULIN (HCC): ICD-10-CM

## 2017-03-22 DIAGNOSIS — E11.00 TYPE 2 DIABETES MELLITUS WITH HYPEROSMOLARITY WITHOUT COMA, WITH LONG-TERM CURRENT USE OF INSULIN (HCC): ICD-10-CM

## 2017-03-22 NOTE — TELEPHONE ENCOUNTER
1. Caller Name: Tonia/wife                                         Call Back Number: [phone] (home)         Patient approves a detailed voicemail message: N\A    Notes show you discontinued this medication but pt's wife states he has continued taking it.  She states his BS has been normal and she feels he really needs ti keep taking.  She states his ins is now paying for it.

## 2017-04-17 RX ORDER — INSULIN GLARGINE 100 [IU]/ML
INJECTION, SOLUTION SUBCUTANEOUS
Qty: 10 ML | Refills: 2 | Status: SHIPPED | OUTPATIENT
Start: 2017-04-17 | End: 2017-07-17 | Stop reason: SDUPTHER

## 2017-04-17 NOTE — TELEPHONE ENCOUNTER
Was the patient seen in the last year in this department? Yes     Does patient have an active prescription for medications requested? No     Received Request Via: Pharmacy      Pt met protocol?: Yes    A1C 1/17

## 2017-05-15 RX ORDER — INSULIN ASPART 100 [IU]/ML
INJECTION, SOLUTION INTRAVENOUS; SUBCUTANEOUS
Refills: 0 | OUTPATIENT
Start: 2017-05-15

## 2017-05-16 ENCOUNTER — HOSPITAL ENCOUNTER (OUTPATIENT)
Dept: LAB | Facility: MEDICAL CENTER | Age: 62
End: 2017-05-16
Attending: NURSE PRACTITIONER
Payer: COMMERCIAL

## 2017-05-16 DIAGNOSIS — E11.00 TYPE 2 DIABETES MELLITUS WITH HYPEROSMOLARITY WITHOUT COMA, WITHOUT LONG-TERM CURRENT USE OF INSULIN (HCC): ICD-10-CM

## 2017-05-16 LAB
ALBUMIN SERPL BCP-MCNC: 4.1 G/DL (ref 3.2–4.9)
ALBUMIN/GLOB SERPL: 1.3 G/DL
ALP SERPL-CCNC: 69 U/L (ref 30–99)
ALT SERPL-CCNC: 66 U/L (ref 2–50)
ANION GAP SERPL CALC-SCNC: 8 MMOL/L (ref 0–11.9)
AST SERPL-CCNC: 30 U/L (ref 12–45)
BILIRUB SERPL-MCNC: 0.4 MG/DL (ref 0.1–1.5)
BUN SERPL-MCNC: 15 MG/DL (ref 8–22)
CALCIUM SERPL-MCNC: 9.2 MG/DL (ref 8.5–10.5)
CHLORIDE SERPL-SCNC: 107 MMOL/L (ref 96–112)
CHOLEST SERPL-MCNC: 215 MG/DL (ref 100–199)
CO2 SERPL-SCNC: 25 MMOL/L (ref 20–33)
CREAT SERPL-MCNC: 0.78 MG/DL (ref 0.5–1.4)
CREAT UR-MCNC: 132 MG/DL
EST. AVERAGE GLUCOSE BLD GHB EST-MCNC: 171 MG/DL
GFR SERPL CREATININE-BSD FRML MDRD: >60 ML/MIN/1.73 M 2
GLOBULIN SER CALC-MCNC: 3.2 G/DL (ref 1.9–3.5)
GLUCOSE SERPL-MCNC: 58 MG/DL (ref 65–99)
HBA1C MFR BLD: 7.6 % (ref 0–5.6)
HDLC SERPL-MCNC: 37 MG/DL
LDLC SERPL CALC-MCNC: 123 MG/DL
MICROALBUMIN UR-MCNC: 14.1 MG/DL
MICROALBUMIN/CREAT UR: 107 MG/G (ref 0–30)
POTASSIUM SERPL-SCNC: 3.9 MMOL/L (ref 3.6–5.5)
PROT SERPL-MCNC: 7.3 G/DL (ref 6–8.2)
SODIUM SERPL-SCNC: 140 MMOL/L (ref 135–145)
TRIGL SERPL-MCNC: 274 MG/DL (ref 0–149)

## 2017-05-16 PROCEDURE — 82043 UR ALBUMIN QUANTITATIVE: CPT

## 2017-05-16 PROCEDURE — 80061 LIPID PANEL: CPT

## 2017-05-16 PROCEDURE — 83036 HEMOGLOBIN GLYCOSYLATED A1C: CPT

## 2017-05-16 PROCEDURE — 36415 COLL VENOUS BLD VENIPUNCTURE: CPT

## 2017-05-16 PROCEDURE — 82570 ASSAY OF URINE CREATININE: CPT

## 2017-05-16 PROCEDURE — 80053 COMPREHEN METABOLIC PANEL: CPT

## 2017-05-18 ENCOUNTER — TELEPHONE (OUTPATIENT)
Dept: MEDICAL GROUP | Facility: PHYSICIAN GROUP | Age: 62
End: 2017-05-18

## 2017-05-18 NOTE — TELEPHONE ENCOUNTER
----- Message from STORMY Navarrete sent at 5/17/2017  6:26 PM PDT -----  Please call patient. I have reviewed their most recent labs and noticed some abnormalities such as abnormal glucose and cholesterol. Please please keep scheduled appointment to review labs

## 2017-05-19 ENCOUNTER — TELEPHONE (OUTPATIENT)
Dept: MEDICAL GROUP | Facility: PHYSICIAN GROUP | Age: 62
End: 2017-05-19

## 2017-05-19 NOTE — TELEPHONE ENCOUNTER
ESTABLISHED PATIENT PRE-VISIT PLANNING     Note: Patient will not be contacted if there is no indication to call.     1.  Reviewed note from last office visit with PCP and/or other med group provider: Yes    2.  If any orders were placed at last visit, do we have Results/Consult Notes?        •  Labs - Labs ordered, completed and results are in chart.       •  Imaging - Imaging was not ordered at last office visit.       •  Referrals - No referrals were ordered at last office visit.    3.  Immunizations were updated in Epic using WebIZ?: Epic matches WebIZ       •  Web Iz Recommendations: PREVNAR (PCV13)  TDAP ZOSTAVAX (Shingles)    4.  Patient is due for the following Health Maintenance Topics:   Health Maintenance Due   Topic Date Due   • RETINAL SCREENING  01/06/1972   • IMM DTaP/Tdap/Td Vaccine (1 - Tdap) 01/06/1973   • IMM PNEUMOCOCCAL 19-64 (ADULT) MEDIUM RISK SERIES (1 of 1 - PPSV23) 01/06/1973   • IMM ZOSTER VACCINE  01/06/2014

## 2017-05-23 ENCOUNTER — OFFICE VISIT (OUTPATIENT)
Dept: MEDICAL GROUP | Facility: PHYSICIAN GROUP | Age: 62
End: 2017-05-23
Payer: COMMERCIAL

## 2017-05-23 VITALS
WEIGHT: 214.6 LBS | TEMPERATURE: 97.3 F | RESPIRATION RATE: 14 BRPM | HEIGHT: 63 IN | SYSTOLIC BLOOD PRESSURE: 138 MMHG | HEART RATE: 96 BPM | OXYGEN SATURATION: 92 % | DIASTOLIC BLOOD PRESSURE: 84 MMHG | BODY MASS INDEX: 38.02 KG/M2

## 2017-05-23 DIAGNOSIS — I15.2 HYPERTENSION ASSOCIATED WITH DIABETES (HCC): ICD-10-CM

## 2017-05-23 DIAGNOSIS — E66.9 OBESITY (BMI 35.0-39.9 WITHOUT COMORBIDITY): ICD-10-CM

## 2017-05-23 DIAGNOSIS — E78.00 PURE HYPERCHOLESTEROLEMIA: ICD-10-CM

## 2017-05-23 DIAGNOSIS — E11.00 TYPE 2 DIABETES MELLITUS WITH HYPEROSMOLARITY WITHOUT COMA, WITH LONG-TERM CURRENT USE OF INSULIN (HCC): ICD-10-CM

## 2017-05-23 DIAGNOSIS — Z79.4 TYPE 2 DIABETES MELLITUS WITH HYPEROSMOLARITY WITHOUT COMA, WITH LONG-TERM CURRENT USE OF INSULIN (HCC): ICD-10-CM

## 2017-05-23 DIAGNOSIS — E11.59 HYPERTENSION ASSOCIATED WITH DIABETES (HCC): ICD-10-CM

## 2017-05-23 DIAGNOSIS — E11.00 TYPE 2 DIABETES MELLITUS WITH HYPEROSMOLARITY WITHOUT COMA, WITHOUT LONG-TERM CURRENT USE OF INSULIN (HCC): ICD-10-CM

## 2017-05-23 PROCEDURE — 99214 OFFICE O/P EST MOD 30 MIN: CPT | Performed by: NURSE PRACTITIONER

## 2017-05-23 RX ORDER — LISINOPRIL 10 MG/1
10 TABLET ORAL DAILY
Qty: 90 TAB | Refills: 1 | Status: SHIPPED | OUTPATIENT
Start: 2017-05-23 | End: 2017-12-20 | Stop reason: SDUPTHER

## 2017-05-23 RX ORDER — GLYBURIDE 5 MG/1
10 TABLET ORAL 2 TIMES DAILY WITH MEALS
Qty: 360 TAB | Refills: 2 | Status: SHIPPED | OUTPATIENT
Start: 2017-05-23 | End: 2017-11-13

## 2017-05-23 NOTE — PROGRESS NOTES
Subjective:     Chief Complaint   Patient presents with   • Results     labs    • Medication Refill     Test strips        HPI:  Jose Antonio Vann is a 61 y.o. male here today to discuss the following:    Hypertension associated with diabetes (CMS-HCC)  Chronic condition: Patient's been treated for hypertension for several years. He is currently taking lisinopril 10 mg daily without any side effects and blood pressure well controlled. Denies any chest pain, diaphoresis, shortness of breath, headaches, dizziness or blurred vision. He is requesting refills on medication today.    Obesity (BMI 35.0-39.9 without comorbidity) ([facility])  Vision is obese with a BMI of 38.02, which is an increase from 36.15 at his previous visit. We discussed dietary modifications to help him decrease weight and improve diabetes. His next appointment will be scheduled with the diabetic nurse educator and myself. Consider additional consultation with dietitian    Pure hypercholesterolemia  Chronic condition: Patient has hyperlipidemia is currently not taking medication because he states that he was told to stop it by his previous physician. He denies any chest pain, diaphoresis, shortness of breath, headaches, dizziness, blurred vision or myalgias. His most recent labs revealed an LDL level of 123 triglycerides 274 and patient may benefit from statin medication.  Lab Results   Component Value Date/Time    CHOLESTEROL,* 05/16/2017 06:15 AM    * 05/16/2017 06:15 AM    HDL 37* 05/16/2017 06:15 AM    TRIGLYCERIDES 274* 05/16/2017 06:15 AM       Lab Results   Component Value Date/Time    SODIUM 140 05/16/2017 06:15 AM    POTASSIUM 3.9 05/16/2017 06:15 AM    CHLORIDE 107 05/16/2017 06:15 AM    CO2 25 05/16/2017 06:15 AM    GLUCOSE 58* 05/16/2017 06:15 AM    BUN 15 05/16/2017 06:15 AM    CREATININE 0.78 05/16/2017 06:15 AM     Lab Results   Component Value Date/Time    ALKALINE PHOSPHATASE 69 05/16/2017 06:15 AM    AST(SGOT) 30 05/16/2017  06:15 AM    ALT(SGPT) 66* 05/16/2017 06:15 AM    TOTAL BILIRUBIN 0.4 05/16/2017 06:15 AM          Type II diabetes mellitus (CMS-HCC)  Chronic Condition: Patient has type 2 diabetes mellitus he checks blood sugar at home and it averages 1:30 to 160. He Denies any tremors, anxiety, weakness, headache, dizziness, blurry vision, fatigue, irritability or palpitations. Patient denies any drowsiness, dry skin, polyphagia or polydipsia, polyuria or nausea. He admits to being noncompliant with his diet.  Last  hgb A1C done May 16, 2017 was 7.6, which is a decrease from 9.7. Fasting glucose was 58 and we discussed decreasing insulin by 2 units in the evening time       Current medicines (including changes today)  Current Outpatient Prescriptions   Medication Sig Dispense Refill   • Glucose Blood (АННА BLOOD GLUCOSE TEST STRIPS VI) by In Vitro route.     • Blood Glucose Monitoring Suppl SUPPLIES Misc Test strips order: Test strips compatible with meter. Sig: use twice daily and prn ssx high or low sugar #200 RF x 3 200 Each 3   • NOVOLOG, insulin aspart, (NOVOLOG FLEXPEN) 100 UNIT/ML Solution Pen-injector injection Inject subcutaneously before meals per sliding scale. 15 PEN 0   • Linagliptin-Metformin HCl (JENTADUETO) 2.5-1000 MG Tab Take 1 tablet by mouth 2 Times a Day. 60 Tab 2   • glyBURIDE (DIABETA) 5 MG Tab Take 2 Tabs by mouth 2 times a day, with meals. 360 Tab 2   • lisinopril (PRINIVIL) 10 MG Tab Take 1 Tab by mouth every day. 90 Tab 1   • LANTUS 100 UNIT/ML Solution INJECT 25 UNITS AS INSTRUCTED EVERY EVENING. 10 mL 2     No current facility-administered medications for this visit.       He  has a past medical history of Uncontrolled type 2 diabetes mellitus with insulin therapy (CMS-HCC).    ROS   Review of Systems   Constitutional: Negative for fever, chills, weight loss and malaise/fatigue.   HENT: Negative for ear pain, nosebleeds, congestion, sore throat and neck pain.    Respiratory: Negative for cough, sputum  "production, shortness of breath and wheezing.    Cardiovascular: Negative for chest pain, palpitations,  and leg swelling.   Gastrointestinal: Negative for heartburn, nausea, vomiting, diarrhea and abdominal pain.   Neurological: Negative for dizziness, tingling, tremors, sensory change, focal weakness and headaches.   Psychiatric/Behavioral: Negative for depression, anxiety, suicidal ideas, insomnia and memory loss.    All other systems reviewed and are negative except as in HPI.     Objective:   Physical Exam:  Blood pressure 138/84, pulse 96, temperature 36.3 °C (97.3 °F), resp. rate 14, height 1.6 m (5' 2.99\"), weight 97.342 kg (214 lb 9.6 oz), SpO2 92 %. Body mass index is 38.02 kg/(m^2).   Physical Exam:  Alert, oriented in no acute distress.  Eye contact is good, speech goal directed, affect calm  HEENT: conjunctiva non-injected, sclera non-icteric.  Pinna normal.   Neck No adenopathy or masses in the neck or supraclavicular regions.  Lungs: clear to auscultation bilaterally with good excursion.  CV: regular rate and rhythm.   Abdomen: soft, nontender, No CVAT. Normal bowel sounds.  Ext: no edema, color normal, vascularity normal, temperature normal  MS: Normal gait and station    Assessment and Plan:   The following treatment plan was discussed  1. Hypertension associated with diabetes (CMS-[facility])     2. Type 2 diabetes mellitus with hyperosmolarity without coma, with long-term current use of insulin (CMS-[facility])  Blood Glucose Monitoring Suppl SUPPLIES Misc    COMP METABOLIC PANEL    HEMOGLOBIN A1C    LIPID PROFILE    NOVOLOG, insulin aspart, (NOVOLOG FLEXPEN) 100 UNIT/ML Solution Pen-injector injection    Linagliptin-Metformin HCl (JENTADUETO) 2.5-1000 MG Tab    Continue current medication. Labs ordered. Patient advised to keep a blood sugar log and bring it to next visit   3. Type 2 diabetes mellitus with hyperosmolarity without coma, without long-term current use of insulin ([facility])  glyBURIDE (DIABETA) 5 MG Tab "    Increase Lantus by 2 units to 27 at bedtime. If blood sugar remains above 200 may increase 2 units to 29 units at bedtime.    4. Obesity (BMI 35.0-39.9 without comorbidity) (HCC)     5. Pure hypercholesterolemia     I have reviewed all recent labs with the patient and answered all questions.    Followup: Return in about 3 months (around 8/23/2017) for the diabetic educator.   Please note that this dictation was created using voice recognition software. I have made every reasonable attempt to correct obvious errors, but I expect that there are errors of grammar and possibly content that I did not discover before finalizing the note.

## 2017-05-23 NOTE — MR AVS SNAPSHOT
"        Jose Antonio Vann   2017 2:00 PM   Office Visit   MRN: [id number]    Department:  [facility]   Dept Phone:  [phone]    Description:  Male : 1956   Provider:  STORMY Navarrete           Reason for Visit     Results labs     Medication Refill Test strips       Allergies as of 2017     No Known Allergies      You were diagnosed with     Hypertension associated with diabetes (CMS-HCC)   [555312]       Type 2 diabetes mellitus with hyperosmolarity without coma, with long-term current use of insulin (CMS-HCC)   [7705591]   Continue current medication. Labs ordered. Patient advised to keep a blood sugar log and bring it to next visit    Type 2 diabetes mellitus with hyperosmolarity without coma, without long-term current use of insulin (CMS-HCC)   [2638921]   Increase Lantus by 2 units to 27 at bedtime. If blood sugar remains above 200 may increase 2 units to 29 units at bedtime.       Vital Signs     Blood Pressure Pulse Temperature Respirations Height Weight    138/84 mmHg 96 36.3 °C (97.3 °F) 14 1.6 m (5' 2.99\") 97.342 kg (214 lb 9.6 oz)    Body Mass Index Oxygen Saturation Smoking Status             38.02 kg/m2 92% Never Smoker          Basic Information     Date Of Birth Sex Race Ethnicity Preferred Language    1956 Male , Unable to Obtain  Origin (Iraqi,Congolese,Belgian,Congolese, etc) English      Your appointments     2017  8:40 AM   New Patient with Samm Grace M.D.   [facility]    [address]  [facility] [id number] [phone]           Please bring Photo ID, Insurance Cards, All Medication Bottles and copies of any legal documents (such as Living Will, Power of ) If speaking a language besides English please bring an adult . Please arrive 30 minutes prior for check in and registration. You will be receiving a confirmation call a few days before your " appointment from our automated call confirmation system.            Sep 15, 2017  2:20 PM   Diabetes Care Visit with STORMY Navarrete, [place] [facility] RN   [facility]    [address] [place] [id number] [phone]           You will be receiving a confirmation call a few days before your appointment from our automated call confirmation system.              Problem List              ICD-10-CM Priority Class Noted - Resolved    Type II diabetes mellitus (CMS-[facility]) E11.9   12/6/2016 - Present    Pure hypercholesterolemia E78.00   12/6/2016 - Present    Hypertension associated with diabetes (CMS-[facility]) E11.59, I10   12/6/2016 - Present    Obesity (BMI 35.0-39.9 without comorbidity) ([facility]) E66.9   1/4/2017 - Present      Health Maintenance        Date Due Completion Dates    RETINAL SCREENING 1/6/1972 ---    IMM DTaP/Tdap/Td Vaccine (1 - Tdap) 1/6/1973 ---    IMM PNEUMOCOCCAL 19-64 (ADULT) MEDIUM RISK SERIES (1 of 1 - PPSV23) 1/6/1973 ---    IMM ZOSTER VACCINE 1/6/2014 ---    A1C SCREENING 11/16/2017 5/16/2017, 1/28/2017    DIABETES MONOFILAMENT / LE EXAM 1/4/2018 1/4/2017    FASTING LIPID PROFILE 5/16/2018 5/16/2017, 1/28/2017    URINE ACR / MICROALBUMIN 5/16/2018 5/16/2017, 1/28/2017    SERUM CREATININE 5/16/2018 5/16/2017, 1/28/2017            Current Immunizations     Influenza Vaccine Quad Inj (Preserved) 10/31/2016, 11/11/2015, 11/14/2012      Below and/or attached are the medications your provider expects you to take. Review all of your home medications and newly ordered medications with your provider and/or pharmacist. Follow medication instructions as directed by your provider and/or pharmacist. Please keep your medication list with you and share with your provider. Update the information when medications are discontinued, doses are changed, or new medications (including over-the-counter products) are added; and carry medication information at all times  in the event of emergency situations     Allergies:  No Known Allergies          Medications  Valid as of: May 23, 2017 -  3:07 PM    Generic Name Brand Name Tablet Size Instructions for use    Blood Glucose Monitoring Suppl (Misc) Blood Glucose Monitoring Suppl SUPPLIES Test strips order: Test strips compatible with meter. Sig: use twice daily and prn ssx high or low sugar #200 RF x 3        Glucose Blood   by In Vitro route.        GlyBURIDE (Tab) DIABETA 5 MG Take 2 Tabs by mouth 2 times a day, with meals.        Insulin Aspart (Solution Pen-injector) NOVOLOG 100 UNIT/ML Inject subcutaneously before meals per sliding scale.        Insulin Glargine (Solution) LANTUS 100 UNIT/ML INJECT 25 UNITS AS INSTRUCTED EVERY EVENING.        Linagliptin-Metformin HCl (Tab) Linagliptin-Metformin HCl 2.5-1000 MG Take 1 tablet by mouth 2 Times a Day.        Lisinopril (Tab) PRINIVIL 10 MG Take 1 Tab by mouth every day.        .                 Medicines prescribed today were sent to:     [id number] - [place], [place] - [address]    [address] [place] [address]    Phone: [phone] Fax: [phone]    Open 24 Hours?: No      Medication refill instructions:       If your prescription bottle indicates you have medication refills left, it is not necessary to call your provider’s office. Please contact your pharmacy and they will refill your medication.    If your prescription bottle indicates you do not have any refills left, you may request refills at any time through one of the following ways: The online [employer] system (except Urgent Care), by calling your provider’s office, or by asking your pharmacy to contact your provider’s office with a refill request. Medication refills are processed only during regular business hours and may not be available until the next business day. Your provider may request additional information or to have a follow-up visit with you prior to refilling your medication.   *Please Note:  Medication refills are assigned a new Rx number when refilled electronically. Your pharmacy may indicate that no refills were authorized even though a new prescription for the same medication is available at the pharmacy. Please request the medicine by name with the pharmacy before contacting your provider for a refill.        Your To Do List     Future Labs/Procedures Complete By Expires    COMP METABOLIC PANEL  8/23/2017 5/23/2018    HEMOGLOBIN A1C  8/23/2017 5/23/2018    LIPID PROFILE  8/23/2017 5/23/2018         [facility] Access Code: [email or web address]  Expires: 6/15/2017  6:05 AM    [facility]  A secure, online tool to manage your health information     [facility]® is a secure, online tool that connects you to your personalized health information from the privacy of your home -- day or night - making it very easy for you to manage your healthcare. Once the activation process is completed, you can even access your medical information using the [facility] ritika, which is available for free in the Apple Ritika store or Google Play store.     [facility] provides the following levels of access (as shown below):   My Chart Features   [facility] Doctor [facility]  Specialists [facility]  Urgent  Care [facility]  Primary Care  Doctor   Email your healthcare team securely and privately 24/7 X X X    Manage appointments: schedule your next appointment; view details of past/upcoming appointments X      Request prescription refills. X      View recent personal medical records, including lab and immunizations X X X X   View health record, including health history, allergies, medications X X X X   Read reports about your outpatient visits, procedures, consult and ER notes X X X X   See your discharge summary, which is a recap of your hospital and/or ER visit that includes your diagnosis, lab results, and care plan. X X       How to register for [facility]:  1. Go to  [email or web address].  2. Click on the Sign Up Now box,  which takes you to the New Member Sign Up page. You will need to provide the following information:  a. Enter your [employer] Access Code exactly as it appears at the top of this page. (You will not need to use this code after you’ve completed the sign-up process. If you do not sign up before the expiration date, you must request a new code.)   b. Enter your date of birth.   c. Enter your home email address.   d. Click Submit, and follow the next screen’s instructions.  3. Create a [employer] ID. This will be your [employer] login ID and cannot be changed, so think of one that is secure and easy to remember.  4. Create a [employer] password. You can change your password at any time.  5. Enter your Password Reset Question and Answer. This can be used at a later time if you forget your password.   6. Enter your e-mail address. This allows you to receive e-mail notifications when new information is available in [employer].  7. Click Sign Up. You can now view your health information.    For assistance activating your [employer] account, call [phone]

## 2017-05-25 DIAGNOSIS — E11.00 TYPE 2 DIABETES MELLITUS WITH HYPEROSMOLARITY WITHOUT COMA, UNSPECIFIED LONG TERM INSULIN USE STATUS: ICD-10-CM

## 2017-05-25 NOTE — ASSESSMENT & PLAN NOTE
Chronic Condition: Patient has type 2 diabetes mellitus he checks blood sugar at home and it averages 1:30 to 160. He Denies any tremors, anxiety, weakness, headache, dizziness, blurry vision, fatigue, irritability or palpitations. Patient denies any drowsiness, dry skin, polyphagia or polydipsia, polyuria or nausea. He admits to being noncompliant with his diet.  Last  hgb A1C done May 16, 2017 was 7.6, which is a decrease from 9.7. Fasting glucose was 58 and we discussed decreasing insulin by 2 units in the evening time

## 2017-05-25 NOTE — ASSESSMENT & PLAN NOTE
Vision is obese with a BMI of 38.02, which is an increase from 36.15 at his previous visit. We discussed dietary modifications to help him decrease weight and improve diabetes. His next appointment will be scheduled with the diabetic nurse educator and myself. Consider additional consultation with dietitian

## 2017-05-25 NOTE — ASSESSMENT & PLAN NOTE
Chronic condition: Patient has hyperlipidemia is currently not taking medication because he states that he was told to stop it by his previous physician. He denies any chest pain, diaphoresis, shortness of breath, headaches, dizziness, blurred vision or myalgias. His most recent labs revealed an LDL level of 123 triglycerides 274 and patient may benefit from statin medication.  Lab Results   Component Value Date/Time    CHOLESTEROL,* 05/16/2017 06:15 AM    * 05/16/2017 06:15 AM    HDL 37* 05/16/2017 06:15 AM    TRIGLYCERIDES 274* 05/16/2017 06:15 AM       Lab Results   Component Value Date/Time    SODIUM 140 05/16/2017 06:15 AM    POTASSIUM 3.9 05/16/2017 06:15 AM    CHLORIDE 107 05/16/2017 06:15 AM    CO2 25 05/16/2017 06:15 AM    GLUCOSE 58* 05/16/2017 06:15 AM    BUN 15 05/16/2017 06:15 AM    CREATININE 0.78 05/16/2017 06:15 AM     Lab Results   Component Value Date/Time    ALKALINE PHOSPHATASE 69 05/16/2017 06:15 AM    AST(SGOT) 30 05/16/2017 06:15 AM    ALT(SGPT) 66* 05/16/2017 06:15 AM    TOTAL BILIRUBIN 0.4 05/16/2017 06:15 AM

## 2017-05-25 NOTE — ASSESSMENT & PLAN NOTE
Chronic condition: Patient's been treated for hypertension for several years. He is currently taking lisinopril 10 mg daily without any side effects and blood pressure well controlled. Denies any chest pain, diaphoresis, shortness of breath, headaches, dizziness or blurred vision. He is requesting refills on medication today.

## 2017-06-08 ENCOUNTER — OFFICE VISIT (OUTPATIENT)
Dept: ENDOCRINOLOGY | Facility: MEDICAL CENTER | Age: 62
End: 2017-06-08
Payer: COMMERCIAL

## 2017-06-08 VITALS
HEIGHT: 63 IN | BODY MASS INDEX: 37.49 KG/M2 | WEIGHT: 211.6 LBS | DIASTOLIC BLOOD PRESSURE: 84 MMHG | OXYGEN SATURATION: 89 % | HEART RATE: 105 BPM | SYSTOLIC BLOOD PRESSURE: 140 MMHG

## 2017-06-08 DIAGNOSIS — E11.65 TYPE 2 DIABETES MELLITUS WITH HYPERGLYCEMIA, WITH LONG-TERM CURRENT USE OF INSULIN (HCC): ICD-10-CM

## 2017-06-08 DIAGNOSIS — E78.2 MIXED HYPERLIPIDEMIA: ICD-10-CM

## 2017-06-08 DIAGNOSIS — I10 ESSENTIAL HYPERTENSION: ICD-10-CM

## 2017-06-08 DIAGNOSIS — Z79.4 TYPE 2 DIABETES MELLITUS WITH HYPERGLYCEMIA, WITH LONG-TERM CURRENT USE OF INSULIN (HCC): ICD-10-CM

## 2017-06-08 PROCEDURE — 99244 OFF/OP CNSLTJ NEW/EST MOD 40: CPT | Performed by: INTERNAL MEDICINE

## 2017-06-08 RX ORDER — ATORVASTATIN CALCIUM 40 MG/1
40 TABLET, FILM COATED ORAL
Qty: 30 TAB | Refills: 6 | Status: SHIPPED | OUTPATIENT
Start: 2017-06-08 | End: 2017-11-13

## 2017-06-08 NOTE — PROGRESS NOTES
Patient with existing type diabetes:  Type 2 diabetes, diagnosed about 12 years ago here for initial consultation     Issues with diabetes since last visit none.   Current Diabetes Medications: Jentadueot 2.5/1000 mg bid, Glyburide 10 mg bid, Lantus 28 units in the evening and Novolog 15 units with breakfast and dinner.    Using insulin pen for Novolog and vial and syring for Lantus, giving injections in abdomen and states he is rotating injection sites.      HbA1c: @hba1c@  Lab Results   Component Value Date/Time    GLYCOHEMOGLOBIN 7.6* 05/16/2017 06:15 AM        FSBS  Testing: testing blood sugars bid.  Morning blood sugars in the  and evening blood sugars in the  190-250 range.     Hypoglycemia: has only had one low blood sugar that he knows of and that was the other day when his morning blood sugar was 61.  He treated by drinking coffee.    Exercise: walking 2-3 times per week for about 40 minutes.     Retinal Exam: last eye exam was a year ago, encouraged to have done soon.      Daily Foot Exam: checks his feet daily and denies any problems.     Routine Dental Exams: past due  Flu vaccine: current.   Pneumonia vaccine unknown.     Education provided by RN, CDE: discussed proper treatment of low blood sugars, discussed goal of A1c closer to 7% or below.

## 2017-06-08 NOTE — MR AVS SNAPSHOT
"        Jose Antonio Vann   2017 8:40 AM   Office Visit   MRN: [id number]    Department:  Endocrinology Med [facility]   Dept Phone:  [phone]    Description:  Male : 1956   Provider:  Samm Grace M.D.           Reason for Visit     Diabetes Mellitus new patient      Allergies as of 2017     No Known Allergies      You were diagnosed with     Type 2 diabetes mellitus with hyperglycemia, with long-term current use of insulin (CMS-[facility])   [4690162]       Essential hypertension   [5967106]       Mixed hyperlipidemia   [272.2.ICD-9-CM]         Vital Signs     Blood Pressure Pulse Height Weight Body Mass Index Oxygen Saturation    140/84 mmHg 105 1.6 m (5' 3\") 95.981 kg (211 lb 9.6 oz) 37.49 kg/m2 89%    Smoking Status                   Never Smoker            Basic Information     Date Of Birth Sex Race Ethnicity Preferred Language    1956 Male , Unable to Obtain  Origin (Ukrainian,Egyptian,Chinese,Danish, etc) English      Your appointments     Sep 15, 2017  2:20 PM   Diabetes Care Visit with STORMY Navarrete, [place] [facility] RN   [facility])    [address] [place] [address] [phone]           You will be receiving a confirmation call a few days before your appointment from our automated call confirmation system.            Oct 12, 2017  1:00 PM   New Patient with Samm Grace M.D.   [facility]    [address]  [facility] [address] [phone]           Please bring Photo ID, Insurance Cards, All Medication Bottles and copies of any legal documents (such as Living Will, Power of ) If speaking a language besides English please bring an adult . Please arrive 30 minutes prior for check in and registration. You will be receiving a confirmation call a few days before your appointment from our automated call confirmation system.              "   Problem List              ICD-10-CM Priority Class Noted - Resolved    Type II diabetes mellitus (CMS-HCC) E11.9   12/6/2016 - Present    Pure hypercholesterolemia E78.00   12/6/2016 - Present    Hypertension associated with diabetes (CMS-HCC) E11.59, I10   12/6/2016 - Present    Obesity (BMI 35.0-39.9 without comorbidity) ([facility]) E66.9   1/4/2017 - Present      Health Maintenance        Date Due Completion Dates    RETINAL SCREENING 1/6/1972 ---    IMM DTaP/Tdap/Td Vaccine (1 - Tdap) 1/6/1973 ---    IMM PNEUMOCOCCAL 19-64 (ADULT) MEDIUM RISK SERIES (1 of 1 - PPSV23) 1/6/1973 ---    IMM ZOSTER VACCINE 1/6/2014 ---    A1C SCREENING 11/16/2017 5/16/2017, 1/28/2017    DIABETES MONOFILAMENT / LE EXAM 1/4/2018 1/4/2017    FASTING LIPID PROFILE 5/16/2018 5/16/2017, 1/28/2017    URINE ACR / MICROALBUMIN 5/16/2018 5/16/2017, 1/28/2017    SERUM CREATININE 5/16/2018 5/16/2017, 1/28/2017            Current Immunizations     Influenza Vaccine Quad Inj (Preserved) 10/31/2016, 11/11/2015, 11/14/2012      Below and/or attached are the medications your provider expects you to take. Review all of your home medications and newly ordered medications with your provider and/or pharmacist. Follow medication instructions as directed by your provider and/or pharmacist. Please keep your medication list with you and share with your provider. Update the information when medications are discontinued, doses are changed, or new medications (including over-the-counter products) are added; and carry medication information at all times in the event of emergency situations     Allergies:  No Known Allergies          Medications  Valid as of: June 08, 2017 -  8:44 AM    Generic Name Brand Name Tablet Size Instructions for use    Atorvastatin Calcium (Tab) LIPITOR 40 MG Take 1 Tab by mouth every bedtime.        Blood Glucose Monitoring Suppl (Misc) Blood Glucose Monitoring Suppl SUPPLIES Test strips order: Test strips compatible with meter. Sig: use  twice daily and prn ssx high or low sugar #200 RF x 3        Glucose Blood   by In Vitro route.        GlyBURIDE (Tab) DIABETA 5 MG Take 2 Tabs by mouth 2 times a day, with meals.        Insulin Aspart (Solution Pen-injector) NOVOLOG 100 UNIT/ML Inject subcutaneously before meals per sliding scale.        Insulin Glargine (Solution) LANTUS 100 UNIT/ML INJECT 25 UNITS AS INSTRUCTED EVERY EVENING.        Linagliptin-Metformin HCl (Tab) Linagliptin-Metformin HCl 2.5-1000 MG Take 1 tablet by mouth 2 Times a Day.        Lisinopril (Tab) PRINIVIL 10 MG Take 1 Tab by mouth every day.        .                 Medicines prescribed today were sent to:     [id number] - [place], [place] - [address]    [address] [place] [address]    Phone: [phone] Fax: [phone]    Open 24 Hours?: No      Medication refill instructions:       If your prescription bottle indicates you have medication refills left, it is not necessary to call your provider’s office. Please contact your pharmacy and they will refill your medication.    If your prescription bottle indicates you do not have any refills left, you may request refills at any time through one of the following ways: The online [employer] system (except Urgent Care), by calling your provider’s office, or by asking your pharmacy to contact your provider’s office with a refill request. Medication refills are processed only during regular business hours and may not be available until the next business day. Your provider may request additional information or to have a follow-up visit with you prior to refilling your medication.   *Please Note: Medication refills are assigned a new Rx number when refilled electronically. Your pharmacy may indicate that no refills were authorized even though a new prescription for the same medication is available at the pharmacy. Please request the medicine by name with the pharmacy before contacting your provider for a refill.           QuantiaMD Access Code: [email or web address]  Expires: 6/15/2017  6:05 AM    QuantiaMD  A secure, online tool to manage your health information     [employer]’s QuantiaMD® is a secure, online tool that connects you to your personalized health information from the privacy of your home -- day or night - making it very easy for you to manage your healthcare. Once the activation process is completed, you can even access your medical information using the QuantiaMD ritika, which is available for free in the Apple Ritika store or Google Play store.     QuantiaMD provides the following levels of access (as shown below):   My Chart Features   [facility] Primary Care Doctor [facility]  Specialists [facility]  Urgent  Care Non-[facility]  Primary Care  Doctor   Email your healthcare team securely and privately 24/7 X X X    Manage appointments: schedule your next appointment; view details of past/upcoming appointments X      Request prescription refills. X      View recent personal medical records, including lab and immunizations X X X X   View health record, including health history, allergies, medications X X X X   Read reports about your outpatient visits, procedures, consult and ER notes X X X X   See your discharge summary, which is a recap of your hospital and/or ER visit that includes your diagnosis, lab results, and care plan. X X       How to register for QuantiaMD:  1. Go to  [email or web address].  2. Click on the Sign Up Now box, which takes you to the New Member Sign Up page. You will need to provide the following information:  a. Enter your QuantiaMD Access Code exactly as it appears at the top of this page. (You will not need to use this code after you’ve completed the sign-up process. If you do not sign up before the expiration date, you must request a new code.)   b. Enter your date of birth.   c. Enter your home email address.   d. Click Submit, and follow the next screen’s instructions.  3. Create a QuantiaMD ID. This will be your QuantiaMD  login ID and cannot be changed, so think of one that is secure and easy to remember.  4. Create a [employer] password. You can change your password at any time.  5. Enter your Password Reset Question and Answer. This can be used at a later time if you forget your password.   6. Enter your e-mail address. This allows you to receive e-mail notifications when new information is available in [employer].  7. Click Sign Up. You can now view your health information.    For assistance activating your Conviva account, call [phone]

## 2017-06-08 NOTE — PROGRESS NOTES
New Patient Consult Note  Primary care physician: STORMY Navarrete    Reason for consult: Diabetes    HPI:  Jose Antonio Vann is a 61 y.o. old patient who comes in today for evaluation of diabetes.    Diabetes: He was diagnosed with type 2 diabetes about 12 years ago. He was started on insulin about 7 years ago. Currently on Lantus 28 units at bedtime, NovoLog 15 units with breakfast and 15 units with dinner, does not take NovoLog with lunch. Also on Jentadueto 2.5/1000 mg one tablet twice a day and glyburide 10 mg twice a day. He checks blood sugars 2 times a day. Fasting blood sugar in the morning is very well controlled, most readings in the range of 110-140. Predinner blood sugar readings are quite variable, ranging from 100-250.    Hypertension: Blood pressure is well controlled. He is on ACE inhibitor.    Hyperlipidemia: Cholesterol levels are not at goal. He is not on statin medication.    ROS:  Constitutional: No unintentional weight loss  Cardiac: No palpitations or racing heart  Resp: No shortness of breath  All other systems were reviewed and were negative.    Past Medical History:  Patient Active Problem List    Diagnosis Date Noted   • Obesity (BMI 35.0-39.9 without comorbidity) ([facility]) 01/04/2017   • Type II diabetes mellitus (CMS-HCC) 12/06/2016   • Pure hypercholesterolemia 12/06/2016   • Hypertension associated with diabetes (CMS-HCC) 12/06/2016       Past Surgical History:  History reviewed. No pertinent past surgical history.    Allergies:  Review of patient's allergies indicates no known allergies.    Social History:  Social History     Social History   • Marital Status:      Spouse Name: N/A   • Number of Children: N/A   • Years of Education: N/A     Occupational History   • Not on file.     Social History Main Topics   • Smoking status: Never Smoker    • Smokeless tobacco: Never Used   • Alcohol Use: Not on file   • Drug Use: Not on file   • Sexual Activity:     Partners:  Female     Other Topics Concern   • Not on file     Social History Narrative       Family History:  History reviewed. No pertinent family history.    Medications:    Current outpatient prescriptions:   •  atorvastatin (LIPITOR) 40 MG Tab, Take 1 Tab by mouth every bedtime., Disp: 30 Tab, Rfl: 6  •  Glucose Blood (АННА BLOOD GLUCOSE TEST STRIPS VI), by In Vitro route., Disp: , Rfl:   •  Linagliptin-Metformin HCl (JENTADUETO) 2.5-1000 MG Tab, Take 1 tablet by mouth 2 Times a Day., Disp: 60 Tab, Rfl: 2  •  glyBURIDE (DIABETA) 5 MG Tab, Take 2 Tabs by mouth 2 times a day, with meals., Disp: 360 Tab, Rfl: 2  •  lisinopril (PRINIVIL) 10 MG Tab, Take 1 Tab by mouth every day., Disp: 90 Tab, Rfl: 1  •  LANTUS 100 UNIT/ML Solution, INJECT 25 UNITS AS INSTRUCTED EVERY EVENING. (Patient taking differently: 228 units  in the evening), Disp: 10 mL, Rfl: 2  •  Blood Glucose Monitoring Suppl SUPPLIES Misc, Test strips order: Test strips compatible with meter. Sig: use twice daily and prn ssx high or low sugar #200 RF x 3, Disp: 200 Each, Rfl: 3  •  NOVOLOG, insulin aspart, (NOVOLOG FLEXPEN) 100 UNIT/ML Solution Pen-injector injection, Inject subcutaneously before meals per sliding scale. (Patient taking differently: Inject 15 Units as instructed 2 times daily, before breakfast and dinner. Inject subcutaneously before meals per sliding scale.), Disp: 15 PEN, Rfl: 0    Labs:  Results for WILLIS RAE (MRN [id number]) as of 6/8/2017 08:45   Ref. Range 1/28/2017 08:56 1/28/2017 08:58 5/16/2017 06:15   Sodium Latest Ref Range: 135-145 mmol/L  137 140   Potassium Latest Ref Range: 3.6-5.5 mmol/L  3.9 3.9   Chloride Latest Ref Range:  mmol/L  103 107   Co2 Latest Ref Range: 20-33 mmol/L  28 25   Anion Gap Latest Ref Range: 0.0-11.9   6.0 8.0   Glucose Latest Ref Range: 65-99 mg/dL  160 (H) 58 (L)   Bun Latest Ref Range: 8-22 mg/dL  12 15   Creatinine Latest Ref Range: 0.50-1.40 mg/dL  0.84 0.78   GFR If  Latest Ref  "Range: >60 mL/min/1.73 m 2  >60 >60   GFR If Non  Latest Ref Range: >60 mL/min/1.73 m 2  >60 >60   Calcium Latest Ref Range: 8.5-10.5 mg/dL  9.3 9.2   AST(SGOT) Latest Ref Range: 12-45 U/L  49 (H) 30   ALT(SGPT) Latest Ref Range: 2-50 U/L  81 (H) 66 (H)   Alkaline Phosphatase Latest Ref Range: 30-99 U/L  77 69   Total Bilirubin Latest Ref Range: 0.1-1.5 mg/dL  0.7 0.4   Albumin Latest Ref Range: 3.2-4.9 g/dL  4.4 4.1   Total Protein Latest Ref Range: 6.0-8.2 g/dL  7.3 7.3   Globulin Latest Ref Range: 1.9-3.5 g/dL  2.9 3.2   A-G Ratio Latest Units: g/dL  1.5 1.3   Glycohemoglobin Latest Ref Range: 0.0-5.6 %  9.7 (H) 7.6 (H)   Estim. Avg Glu Latest Units: mg/dL  232 171   Cholesterol,Tot Latest Ref Range: 100-199 mg/dL  242 (H) 215 (H)   Triglycerides Latest Ref Range: 0-149 mg/dL  245 (H) 274 (H)   HDL Latest Ref Range: >=40 mg/dL  40 37 (A)   LDL Latest Ref Range: <100 mg/dL  153 (H) 123 (H)   Micro Alb Creat Ratio Latest Ref Range: 0-30 mg/g 116 (H)  107 (H)   Creatinine, Urine Latest Units: mg/dL 198.30  132.00   Microalbumin, Urine Random Latest Units: mg/dL 23.1  14.1     Physical Examination:  Vital signs: /84 mmHg  Pulse 105  Ht 1.6 m (5' 3\")  Wt 95.981 kg (211 lb 9.6 oz)  BMI 37.49 kg/m2  SpO2 89%  General: No apparent distress, cooperative  Eyes: No scleral icterus or discharge  ENMT: Normal on external inspection of nose, lips  Neck: No abnormal masses on inspection  Resp: Normal effort, clear to auscultation bilaterally   CVS: Regular rate and rhythm, S1 S2 normal, no murmur   Extremities: No edema  Neuro: Alert and oriented  Skin: No rash  Psych: Normal mood and affect    Assessment and Plan:    1. Type 2 diabetes mellitus with hyperglycemia, with long-term current use of insulin (CMS-[facility])  · Recent hemoglobin A1c is 7.6%  · Goal hemoglobin A1c less than 7%  · Continue Lantus 28 units at bedtime, NovoLog 15 units with breakfast and dinner  · Continue jentadueto, glyburide  · We " discussed about avoiding carbs for lunch or taking NovoLog shot with lunch as well  · Advised to check blood sugars 3 times a day    2. Essential hypertension  · Blood pressure is well controlled  · Continue ACE inhibitor    3. Mixed hyperlipidemia  · We discussed about pros and cons of statins, started Lipitor  - atorvastatin (LIPITOR) 40 MG Tab; Take 1 Tab by mouth every bedtime.  Dispense: 30 Tab; Refill: 6    Return in about 4 months (around 10/8/2017).    Thank you for allowing me to participate in the care of this patient.    Samm Grace M.D.  06/08/2017    CC:   STORMY Navarrete    This note was created using voice recognition software (Dragon). The accuracy of the dictation is limited by the abilities of the software. I have reviewed the note prior to signing, however some errors in grammar and context are still possible. If you have any questions related to this note please do not hesitate to contact our office.

## 2017-07-17 NOTE — TELEPHONE ENCOUNTER
Was the patient seen in the last year in this department? Yes 05/23/2017    Does patient have an active prescription for medications requested? No     Received Request Via: Pharmacy

## 2017-07-18 RX ORDER — INSULIN GLARGINE 100 [IU]/ML
INJECTION, SOLUTION SUBCUTANEOUS
Qty: 10 ML | Refills: 2 | Status: SHIPPED | OUTPATIENT
Start: 2017-07-18 | End: 2017-11-13

## 2017-08-21 RX ORDER — LINAGLIPTIN AND METFORMIN HYDROCHLORIDE 2.5; 1 MG/1; MG/1
TABLET, FILM COATED ORAL
Qty: 180 TAB | Refills: 1 | Status: SHIPPED | OUTPATIENT
Start: 2017-08-21 | End: 2017-11-13 | Stop reason: SDUPTHER

## 2017-08-21 NOTE — TELEPHONE ENCOUNTER
Patient has recently been seen by PCP within the last 6 months per protocol (5/17). Will refill medications for 6 months.  Lab Results   Component Value Date/Time    GLYCOHEMOGLOBIN 7.6* 05/16/2017 06:15 AM      Lab Results   Component Value Date/Time    MICRO ALB CREAT RATIO 107* 05/16/2017 06:15 AM    MICROALBUMIN, URINE RANDOM 14.1 05/16/2017 06:15 AM      Lab Results   Component Value Date/Time    ALKALINE PHOSPHATASE 69 05/16/2017 06:15 AM    AST(SGOT) 30 05/16/2017 06:15 AM    ALT(SGPT) 66* 05/16/2017 06:15 AM    TOTAL BILIRUBIN 0.4 05/16/2017 06:15 AM

## 2017-09-06 ENCOUNTER — TELEPHONE (OUTPATIENT)
Dept: MEDICAL GROUP | Facility: PHYSICIAN GROUP | Age: 62
End: 2017-09-06

## 2017-09-06 ENCOUNTER — TELEPHONE (OUTPATIENT)
Dept: MEDICAL GROUP | Facility: MEDICAL CENTER | Age: 62
End: 2017-09-06

## 2017-09-06 ENCOUNTER — HOSPITAL ENCOUNTER (OUTPATIENT)
Dept: LAB | Facility: MEDICAL CENTER | Age: 62
End: 2017-09-06
Attending: NURSE PRACTITIONER
Payer: COMMERCIAL

## 2017-09-06 DIAGNOSIS — Z79.4 TYPE 2 DIABETES MELLITUS WITH HYPEROSMOLARITY WITHOUT COMA, WITH LONG-TERM CURRENT USE OF INSULIN (HCC): ICD-10-CM

## 2017-09-06 DIAGNOSIS — E11.00 TYPE 2 DIABETES MELLITUS WITH HYPEROSMOLARITY WITHOUT COMA, WITH LONG-TERM CURRENT USE OF INSULIN (HCC): ICD-10-CM

## 2017-09-06 LAB
ALBUMIN SERPL BCP-MCNC: 4.1 G/DL (ref 3.2–4.9)
ALBUMIN/GLOB SERPL: 1.3 G/DL
ALP SERPL-CCNC: 74 U/L (ref 30–99)
ALT SERPL-CCNC: 58 U/L (ref 2–50)
ANION GAP SERPL CALC-SCNC: 8 MMOL/L (ref 0–11.9)
AST SERPL-CCNC: 33 U/L (ref 12–45)
BILIRUB SERPL-MCNC: 0.5 MG/DL (ref 0.1–1.5)
BUN SERPL-MCNC: 13 MG/DL (ref 8–22)
CALCIUM SERPL-MCNC: 9.6 MG/DL (ref 8.5–10.5)
CHLORIDE SERPL-SCNC: 104 MMOL/L (ref 96–112)
CHOLEST SERPL-MCNC: 246 MG/DL (ref 100–199)
CO2 SERPL-SCNC: 27 MMOL/L (ref 20–33)
CREAT SERPL-MCNC: 0.76 MG/DL (ref 0.5–1.4)
EST. AVERAGE GLUCOSE BLD GHB EST-MCNC: 194 MG/DL
GFR SERPL CREATININE-BSD FRML MDRD: >60 ML/MIN/1.73 M 2
GLOBULIN SER CALC-MCNC: 3.2 G/DL (ref 1.9–3.5)
GLUCOSE SERPL-MCNC: 45 MG/DL (ref 65–99)
HBA1C MFR BLD: 8.4 % (ref 0–5.6)
HDLC SERPL-MCNC: 34 MG/DL
LDLC SERPL CALC-MCNC: ABNORMAL MG/DL
POTASSIUM SERPL-SCNC: 3.7 MMOL/L (ref 3.6–5.5)
PROT SERPL-MCNC: 7.3 G/DL (ref 6–8.2)
SODIUM SERPL-SCNC: 139 MMOL/L (ref 135–145)
TRIGL SERPL-MCNC: 418 MG/DL (ref 0–149)

## 2017-09-06 PROCEDURE — 80061 LIPID PANEL: CPT

## 2017-09-06 PROCEDURE — 80053 COMPREHEN METABOLIC PANEL: CPT

## 2017-09-06 PROCEDURE — 83036 HEMOGLOBIN GLYCOSYLATED A1C: CPT

## 2017-09-06 PROCEDURE — 36415 COLL VENOUS BLD VENIPUNCTURE: CPT

## 2017-09-06 NOTE — TELEPHONE ENCOUNTER
----- Message from TAY Puckett sent at 9/6/2017  1:31 PM PDT -----  Diabetes has worsened.  Please advise him to follow-up on 9/15/17 as scheduled.  Thank you

## 2017-09-07 NOTE — TELEPHONE ENCOUNTER
After hours phone call  Call on 9/6/2017 at 8:18 PM from Jose Antonio Vann [phone] (home)  who reports PCP: STORMY Navarrete.    Received call from lab, critical glucose of 45 drawn this morning.  Called phone listed in pt's chart, spoke to alirio, his emergency contact.  She reports that he's well and that his sugars usually runs high. She's not aware of any hypoglycemic episodes, but she will call her dad to make sure.    Plan: advised her to have her dad check his fasting sugars for the next couple of days and update his provider. Discussed symptoms of hypoglycemia, she will relay this info.    Stella Guallpa M.D.

## 2017-09-12 ENCOUNTER — TELEPHONE (OUTPATIENT)
Dept: MEDICAL GROUP | Facility: PHYSICIAN GROUP | Age: 62
End: 2017-09-12

## 2017-09-12 NOTE — TELEPHONE ENCOUNTER
----- Message from STOMRY Navarrete sent at 9/12/2017 12:54 PM PDT -----  Please call patient. I have reviewed their most recent labs and noticed some abnormalities such as abnormal blood sugar and elevated triglycerides that may benefit from further treatment. Please schedule an appointment with diabetic CNE to discuss further treatment options.

## 2017-09-12 NOTE — TELEPHONE ENCOUNTER
Results were results on 9/6/17 from Colleen Trent and appointment is on 9/17/17 with the diabetic RN.

## 2017-10-12 ENCOUNTER — APPOINTMENT (OUTPATIENT)
Dept: ENDOCRINOLOGY | Facility: MEDICAL CENTER | Age: 62
End: 2017-10-12
Payer: COMMERCIAL

## 2017-10-23 ENCOUNTER — TELEPHONE (OUTPATIENT)
Dept: MEDICAL GROUP | Facility: PHYSICIAN GROUP | Age: 62
End: 2017-10-23

## 2017-10-23 NOTE — TELEPHONE ENCOUNTER
1. Caller Name: Tonia                                         Call Back Number: [phone]      Patient approves a detailed voicemail message: N\A    Pt needs a rx for a freestyle lite glucometer along with test strips and lancets.  His insurance will pay for this brand.

## 2017-10-25 RX ORDER — LANCETS 30 GAUGE
EACH MISCELLANEOUS
Qty: 100 EACH | Refills: 3 | Status: SHIPPED | OUTPATIENT
Start: 2017-10-25 | End: 2019-08-16

## 2017-11-13 ENCOUNTER — OFFICE VISIT (OUTPATIENT)
Dept: MEDICAL GROUP | Facility: PHYSICIAN GROUP | Age: 62
End: 2017-11-13
Payer: COMMERCIAL

## 2017-11-13 VITALS
SYSTOLIC BLOOD PRESSURE: 130 MMHG | BODY MASS INDEX: 34.99 KG/M2 | DIASTOLIC BLOOD PRESSURE: 72 MMHG | TEMPERATURE: 97.3 F | WEIGHT: 210 LBS | RESPIRATION RATE: 14 BRPM | HEART RATE: 80 BPM | OXYGEN SATURATION: 93 % | HEIGHT: 65 IN

## 2017-11-13 DIAGNOSIS — E11.9 TYPE 2 DIABETES MELLITUS WITHOUT COMPLICATION, WITH LONG-TERM CURRENT USE OF INSULIN (HCC): ICD-10-CM

## 2017-11-13 DIAGNOSIS — Z79.4 TYPE 2 DIABETES MELLITUS WITHOUT COMPLICATION, WITH LONG-TERM CURRENT USE OF INSULIN (HCC): ICD-10-CM

## 2017-11-13 DIAGNOSIS — I15.2 HYPERTENSION ASSOCIATED WITH DIABETES (HCC): ICD-10-CM

## 2017-11-13 DIAGNOSIS — E11.59 HYPERTENSION ASSOCIATED WITH DIABETES (HCC): ICD-10-CM

## 2017-11-13 DIAGNOSIS — E78.00 PURE HYPERCHOLESTEROLEMIA: ICD-10-CM

## 2017-11-13 DIAGNOSIS — Z23 NEED FOR VACCINATION: ICD-10-CM

## 2017-11-13 DIAGNOSIS — E11.00 TYPE 2 DIABETES MELLITUS WITH HYPEROSMOLARITY WITHOUT COMA, WITHOUT LONG-TERM CURRENT USE OF INSULIN (HCC): ICD-10-CM

## 2017-11-13 PROCEDURE — 90715 TDAP VACCINE 7 YRS/> IM: CPT | Performed by: FAMILY MEDICINE

## 2017-11-13 PROCEDURE — 99214 OFFICE O/P EST MOD 30 MIN: CPT | Mod: 25 | Performed by: FAMILY MEDICINE

## 2017-11-13 PROCEDURE — 90686 IIV4 VACC NO PRSV 0.5 ML IM: CPT | Performed by: FAMILY MEDICINE

## 2017-11-13 PROCEDURE — 90472 IMMUNIZATION ADMIN EACH ADD: CPT | Performed by: FAMILY MEDICINE

## 2017-11-13 PROCEDURE — 90732 PPSV23 VACC 2 YRS+ SUBQ/IM: CPT | Performed by: FAMILY MEDICINE

## 2017-11-13 PROCEDURE — 90471 IMMUNIZATION ADMIN: CPT | Performed by: FAMILY MEDICINE

## 2017-11-13 RX ORDER — INSULIN GLARGINE 100 [IU]/ML
INJECTION, SOLUTION SUBCUTANEOUS
Qty: 10 ML | Refills: 2 | OUTPATIENT
Start: 2017-11-13

## 2017-11-13 RX ORDER — ATORVASTATIN CALCIUM 40 MG/1
40 TABLET, FILM COATED ORAL DAILY
Qty: 90 TAB | Refills: 3 | Status: SHIPPED | OUTPATIENT
Start: 2017-11-13 | End: 2017-12-20 | Stop reason: SDUPTHER

## 2017-11-13 ASSESSMENT — PATIENT HEALTH QUESTIONNAIRE - PHQ9: CLINICAL INTERPRETATION OF PHQ2 SCORE: 0

## 2017-11-13 NOTE — PROGRESS NOTES
Chief Complaint   Patient presents with   • Establish Care   • Medication Management     atorvastatin       HISTORY OF PRESENT ILLNESS: Patient is a 61 y.o. male established patient here today for the following concerns:    1. Need for vaccination  Due for Pneumovax, flu and Tdap    2. Type 2 diabetes mellitus without complication, with long-term current use of insulin (CMS-HCC)  Has about 8 year hx of DM2 without known complications.  Reports eye exam is up to date.  He denies any neuropathy.  No hx of MI or stroke.  He continues on basal bolus insulin therapy and oral hypoglycemics.  Last A1c was not well controlled 2 months ago.  Reports he does get some hypoglycemia in the morning    3. Pure hypercholesterolemia  Has stopped taking the atorvastatin when it did not get renewed thinking he no longer needed it.  Last lipids definitely showed need for statin therapy. No CP.     4. Hypertension associated with diabetes (CMS-HCC)  Continues on lisinopril 10 mg daily with good tolerability.          Past Medical, Social, and Family history reviewed and updated in EPIC    Allergies:Review of patient's allergies indicates no known allergies.    Current Outpatient Prescriptions   Medication Sig Dispense Refill   • atorvastatin (LIPITOR) 40 MG Tab Take 1 Tab by mouth every day. 90 Tab 3   • Insulin Pen Needle (BD PEN NEEDLE JOSEP U/F) 32G X 4 MM Misc 1 Device by Does not apply route 4 times a day. 120 Each 11   • Insulin Degludec (TRESIBA FLEXTOUCH) 100 UNIT/ML Solution Pen-injector Inject 20 Units as instructed every bedtime. 3 PEN 11   • Linagliptin-Metformin HCl ER 5-1000 MG TABLET SR 24 HR Take 1 Tab by mouth every day. 90 Tab 3   • Lancets Misc Lancets order: Lancets for Abbott Freestyle Lite meter. Sig: use daily and prn ssx high or low sugar. #100 RF x 3 100 Each 3   • Blood Glucose Monitoring Suppl Supplies Misc Test strips order: Test strips for Abbott Freestyle Lite meter. Sig: daily use and prn ssx high or low  "sugar #100 RF x 3 100 Each 3   • Glucose Blood (АННА BLOOD GLUCOSE TEST STRIPS VI) by In Vitro route.     • Blood Glucose Monitoring Suppl SUPPLIES Misc Test strips order: Test strips compatible with meter. Sig: use twice daily and prn ssx high or low sugar #200 RF x 3 200 Each 3   • NOVOLOG, insulin aspart, (NOVOLOG FLEXPEN) 100 UNIT/ML Solution Pen-injector injection Inject subcutaneously before meals per sliding scale. (Patient taking differently: Inject 15 Units as instructed 2 times daily, before breakfast and dinner. Inject subcutaneously before meals per sliding scale.) 15 PEN 0   • lisinopril (PRINIVIL) 10 MG Tab Take 1 Tab by mouth every day. 90 Tab 1     No current facility-administered medications for this visit.          ROS:  Review of Systems   Constitutional: Negative for fever, chills, weight loss and malaise/fatigue.   HENT: Negative for ear pain, nosebleeds, congestion, sore throat and neck pain.    Eyes: Negative for blurred vision.   Respiratory: Negative for cough, sputum production, shortness of breath and wheezing.    Cardiovascular: Negative for chest pain, palpitations,  and leg swelling.   Gastrointestinal: Negative for heartburn, nausea, vomiting, diarrhea and abdominal pain.   Genitourinary: Negative for dysuria, urgency and frequency.   Musculoskeletal: Negative for myalgias, back pain and joint pain.   Skin: Negative for rash and itching.   Neurological: Negative for dizziness, tingling, tremors, sensory change, focal weakness and headaches.   Endo/Heme/Allergies: Does not bruise/bleed easily.   Psychiatric/Behavioral: Negative for depression, anxiety, suicidal ideas, insomnia and memory loss.      Exam:  Blood pressure 130/72, pulse 80, temperature 36.3 °C (97.3 °F), resp. rate 14, height 1.651 m (5' 5\"), weight 95.3 kg (210 lb), SpO2 93 %.    General:  Well nourished, well developed in NAD  Head is grossly normal.  Neck: Supple without JVD   Pulmonary:  Normal effort.   Cardiovascular: " Regular rate  Extremities: no clubbing, cyanosis, or edema.  Psych: affect appropriate      Please note that this dictation was created using voice recognition software. I have made every reasonable attempt to correct obvious errors, but I expect that there are errors of grammar and possibly content that I did not discover before finalizing the note.    Assessment/Plan:  1. Need for vaccination    - INFLUENZA VACCINE QUAD INJ >3Y(PF)  - TDAP VACCINE =>6YO IM  - Pneumococal Polysaccharide Vaccine 23-Valent =>1yo SQ/IM    2. Type 2 diabetes mellitus without complication, with long-term current use of insulin (CMS-HCC)  D/c lantus and start Tresiba at 20 units, d/c glyburide due to the hypoglycemia and will titrate up the tresiba. Increase to ER Jentadueto for ease of administration and better adherence.    1 month follow up with DM log.   - Insulin Degludec (TRESIBA FLEXTOUCH) 100 UNIT/ML Solution Pen-injector; Inject 20 Units as instructed every bedtime.  Dispense: 3 PEN; Refill: 11  - Linagliptin-Metformin HCl ER 5-1000 MG TABLET SR 24 HR; Take 1 Tab by mouth every day.  Dispense: 90 Tab; Refill: 3    3. Pure hypercholesterolemia  Restart atorvastatin, check labs in 2-3 months.     4. Hypertension associated with diabetes (CMS-HCC)  Continue lisinopril, if BP does not reduce with better glycemic control, will increase to 20 mg next visit.         1 month follow up

## 2017-11-21 ENCOUNTER — TELEPHONE (OUTPATIENT)
Dept: MEDICAL GROUP | Facility: PHYSICIAN GROUP | Age: 62
End: 2017-11-21

## 2017-11-21 NOTE — TELEPHONE ENCOUNTER
This appears to be [place]'s patient not mine.  Please reflect in PCP and see what she advises for alternative.

## 2017-11-21 NOTE — TELEPHONE ENCOUNTER
Nevermind, I see where he and his wife have established.  I will change the medication to formulary alternatives if possible - Janumet and Toujeo sent.

## 2017-11-21 NOTE — TELEPHONE ENCOUNTER
1. Caller Name: Tonia                                         Call Back Number: [phone]      Patient approves a detailed voicemail message: N\A    Per pt's pharmacy Jentadueto and Tresiba are not covered by pt's insurance

## 2017-11-29 ENCOUNTER — TELEPHONE (OUTPATIENT)
Dept: MEDICAL GROUP | Facility: PHYSICIAN GROUP | Age: 62
End: 2017-11-29

## 2017-11-29 DIAGNOSIS — E11.9 TYPE 2 DIABETES MELLITUS WITHOUT COMPLICATION, WITH LONG-TERM CURRENT USE OF INSULIN (HCC): ICD-10-CM

## 2017-11-29 DIAGNOSIS — Z79.4 TYPE 2 DIABETES MELLITUS WITHOUT COMPLICATION, WITH LONG-TERM CURRENT USE OF INSULIN (HCC): ICD-10-CM

## 2017-11-29 NOTE — TELEPHONE ENCOUNTER
1. Caller Name: Tonia/dtr                                         Call Back Number: [phone]      Patient approves a detailed voicemail message: N\A    Pt's dtr states his Janumet  mg was used to replace Jentadueto 2.5-1000 mg.  She is asking if Janument should be  mg.  Please advise.

## 2017-12-01 DIAGNOSIS — E11.9 TYPE 2 DIABETES MELLITUS WITHOUT COMPLICATION, WITH LONG-TERM CURRENT USE OF INSULIN (HCC): ICD-10-CM

## 2017-12-01 DIAGNOSIS — Z79.4 TYPE 2 DIABETES MELLITUS WITHOUT COMPLICATION, WITH LONG-TERM CURRENT USE OF INSULIN (HCC): ICD-10-CM

## 2017-12-01 NOTE — TELEPHONE ENCOUNTER
Patient requests change in pharmacy.    [facility] - [place], [place] - [address]  [address]  [place] [address]  Phone: [phone] Fax: [phone]

## 2017-12-01 NOTE — TELEPHONE ENCOUNTER
Was the patient seen in the last year in this department? Yes     Does patient have an active prescription for medications requested? Yes pharmacy change    Received Request Via: Patient

## 2017-12-04 DIAGNOSIS — E11.00 TYPE 2 DIABETES MELLITUS WITH HYPEROSMOLARITY WITHOUT COMA, WITH LONG-TERM CURRENT USE OF INSULIN (HCC): ICD-10-CM

## 2017-12-04 DIAGNOSIS — Z79.4 TYPE 2 DIABETES MELLITUS WITH HYPEROSMOLARITY WITHOUT COMA, WITH LONG-TERM CURRENT USE OF INSULIN (HCC): ICD-10-CM

## 2017-12-06 DIAGNOSIS — E11.9 TYPE 2 DIABETES MELLITUS WITHOUT COMPLICATION, WITH LONG-TERM CURRENT USE OF INSULIN (HCC): ICD-10-CM

## 2017-12-06 DIAGNOSIS — Z79.4 TYPE 2 DIABETES MELLITUS WITHOUT COMPLICATION, WITH LONG-TERM CURRENT USE OF INSULIN (HCC): ICD-10-CM

## 2017-12-06 RX ORDER — INSULIN ASPART 100 [IU]/ML
INJECTION, SOLUTION INTRAVENOUS; SUBCUTANEOUS
Qty: 5 PEN | Refills: 0 | Status: SHIPPED | OUTPATIENT
Start: 2017-12-06 | End: 2017-12-20 | Stop reason: SDUPTHER

## 2017-12-06 NOTE — TELEPHONE ENCOUNTER
Was the patient seen in the last year in this department? Yes     Does patient have an active prescription for medications requested? No     Received Request Via: Pharmacy      Pt met protocol?: Yes pt last ov 11/2017   Lab Results   Component Value Date/Time    HBA1C 8.4 (H) 09/06/2017 06:43 AM      Glucose   Date Value Ref Range Status   09/06/2017 45 (LL) 65 - 99 mg/dL Final     Comment:     Results confirmed by repeat testing.

## 2017-12-08 NOTE — TELEPHONE ENCOUNTER
Was the patient seen in the last year in this department? Yes     Does patient have an active prescription for medications requested? No     Received Request Via: Pharmacy      Pt met protocol?: Yes,

## 2017-12-13 DIAGNOSIS — E11.9 TYPE 2 DIABETES MELLITUS WITHOUT COMPLICATION, WITH LONG-TERM CURRENT USE OF INSULIN (HCC): ICD-10-CM

## 2017-12-13 DIAGNOSIS — Z79.4 TYPE 2 DIABETES MELLITUS WITHOUT COMPLICATION, WITH LONG-TERM CURRENT USE OF INSULIN (HCC): ICD-10-CM

## 2017-12-19 RX ORDER — INSULIN GLARGINE 100 [IU]/ML
INJECTION, SOLUTION SUBCUTANEOUS
Qty: 10 ML | Refills: 0 | OUTPATIENT
Start: 2017-12-19

## 2017-12-19 NOTE — TELEPHONE ENCOUNTER
Both pen and vial qued up, pen is listed as current     Was the patient seen in the last year in this department? Yes     Does patient have an active prescription for medications requested? No     Received Request Via: Pharmacy      Pt met protocol?: Yes, OV 11/17   Lab Results   Component Value Date/Time    HBA1C 8.4 (H) 09/06/2017 06:43 AM

## 2017-12-20 ENCOUNTER — OFFICE VISIT (OUTPATIENT)
Dept: MEDICAL GROUP | Facility: PHYSICIAN GROUP | Age: 62
End: 2017-12-20
Payer: COMMERCIAL

## 2017-12-20 VITALS
DIASTOLIC BLOOD PRESSURE: 72 MMHG | BODY MASS INDEX: 34.52 KG/M2 | SYSTOLIC BLOOD PRESSURE: 132 MMHG | HEIGHT: 65 IN | RESPIRATION RATE: 16 BRPM | WEIGHT: 207.2 LBS | TEMPERATURE: 97.3 F | OXYGEN SATURATION: 94 % | HEART RATE: 86 BPM

## 2017-12-20 DIAGNOSIS — E11.9 TYPE 2 DIABETES MELLITUS WITHOUT COMPLICATION, WITH LONG-TERM CURRENT USE OF INSULIN (HCC): ICD-10-CM

## 2017-12-20 DIAGNOSIS — Z12.5 SCREENING FOR PROSTATE CANCER: ICD-10-CM

## 2017-12-20 DIAGNOSIS — E78.5 HYPERLIPIDEMIA ASSOCIATED WITH TYPE 2 DIABETES MELLITUS (HCC): ICD-10-CM

## 2017-12-20 DIAGNOSIS — Z79.4 TYPE 2 DIABETES MELLITUS WITHOUT COMPLICATION, WITH LONG-TERM CURRENT USE OF INSULIN (HCC): ICD-10-CM

## 2017-12-20 DIAGNOSIS — E11.69 HYPERLIPIDEMIA ASSOCIATED WITH TYPE 2 DIABETES MELLITUS (HCC): ICD-10-CM

## 2017-12-20 PROCEDURE — 99214 OFFICE O/P EST MOD 30 MIN: CPT | Performed by: FAMILY MEDICINE

## 2017-12-20 RX ORDER — ATORVASTATIN CALCIUM 40 MG/1
40 TABLET, FILM COATED ORAL DAILY
Qty: 90 TAB | Refills: 3 | Status: SHIPPED | OUTPATIENT
Start: 2017-12-20 | End: 2018-09-13 | Stop reason: SDUPTHER

## 2017-12-20 RX ORDER — LISINOPRIL 10 MG/1
10 TABLET ORAL DAILY
Qty: 90 TAB | Refills: 3 | Status: SHIPPED | OUTPATIENT
Start: 2017-12-20 | End: 2018-03-01 | Stop reason: SDUPTHER

## 2017-12-20 NOTE — PROGRESS NOTES
Diabetes Focused Exam:    Chief Complaint   Patient presents with   • Follow-Up     meds      Subjective:   PEG Vann is a 61 y.o. male who presents for follow up of chronic conditions of diabetes mellitus, hypertension,  and hyperlipidemia. He indicates that he is feeling well and denies any symptoms referable to the above diagnoses. Specifically denies chest pain, palpitations, dyspnea, orthopnea, PND or peripheral edema. Also denies polyuria, polydipsia, urinary complaints, abdominal complaints, myalgias, numbness, weakness or other related symptoms.   The patient is taking ASA every day  and taking all other medications as prescribed . Patient denies any side effects of medication.  DM: A1c goal <7  Glucose monitoring frequency: twice daily    Fasting sugars: 90-180s. Post-prandial sugars: 160-220  Hypoglycemic episodes none  Diabetic complications: none  ACR Albumin/Creatinine Ratio goal <30   HTN: Blood pressure goal <140/<80 . Currently Rx ACE/ARB: Yes  Hyperlipidemia:Cholesterol goal LDL <100, total/HDL <5 . Currently Rx Statin: Yes  Last eye exam   Denies visual blurring, double vision, eye pain and floaters  Last monofilament foot exam: Denies foot pain, numbness, calluses, ulcers      See medications and orders placed in encounter report.  Past medical history, family history, social history reviewed and updated as documented in medical record.  Current medications including changes today:  Current Outpatient Prescriptions   Medication Sig Dispense Refill   • insulin glargine (LANTUS SOLOSTAR) 100 UNIT/ML Solution Pen-injector injection Inject 30 Units as instructed every evening. 27 mL 3   • Insulin Pen Needle (BD PEN NEEDLE JOSEP U/F) 32G X 4 MM Misc 1 Device by Does not apply route 3 times a day. 270 Each 3   • NOVOLOG, insulin aspart, (NOVOLOG FLEXPEN) 100 UNIT/ML Solution Pen-injector injection Inject 20 Units as instructed 3 times a day before meals. 54 mL 3   • SitaGLIPtin-MetFORMIN HCl  "(JANUMET XR)  MG TABLET SR 24 HR Take 2 Tabs by mouth every day. 180 Tab 3   • atorvastatin (LIPITOR) 40 MG Tab Take 1 Tab by mouth every day. 90 Tab 3   • lisinopril (PRINIVIL) 10 MG Tab Take 1 Tab by mouth every day. 90 Tab 3   • Insulin Pen Needle (BD PEN NEEDLE JOSEP U/F) 32G X 4 MM Misc 1 Device by Does not apply route 4 times a day. 120 Each 11   • Lancets Misc Lancets order: Lancets for Abbott Freestyle Lite meter. Sig: use daily and prn ssx high or low sugar. #100 RF x 3 100 Each 3   • Blood Glucose Monitoring Suppl Supplies Misc Test strips order: Test strips for Abbott Freestyle Lite meter. Sig: daily use and prn ssx high or low sugar #100 RF x 3 100 Each 3   • Glucose Blood (АННА BLOOD GLUCOSE TEST STRIPS VI) by In Vitro route.     • Blood Glucose Monitoring Suppl SUPPLIES Misc Test strips order: Test strips compatible with meter. Sig: use twice daily and prn ssx high or low sugar #200 RF x 3 200 Each 3     No current facility-administered medications for this visit.      Allergies: No Known Allergies   Social History   Substance Use Topics   • Smoking status: Never Smoker   • Smokeless tobacco: Never Used   • Alcohol use No     Exercise: occasional walking   Health Maintenance/Immunizations: UTD    ROS  Pertinent  ROS findings as above. All other systems reviewed and are negative.      Objective:     OBJECTIVE:  /72   Pulse 86   Temp 36.3 °C (97.3 °F)   Resp 16   Ht 1.651 m (5' 5\")   Wt 94 kg (207 lb 3.2 oz)   SpO2 94%   BMI 34.48 kg/m²  Body mass index is 34.48 kg/m². BMI: moderately obese  General: No apparent distress, conversant, cooperative and pleasant with the examination.  Psych: Alert and oriented x4, judgment and insight normal  Neck: No JVD or bruits, no adenopathy, supple  Thyroid: normal to inspection and palpation  Lungs: negative  Heart: PMI normal. No lifts, heaves, or thrills. RRR. No murmurs, clicks, gallops, or rubs  Abdomen: Soft, nontender, no hepatosplenomegaly or " masses, normal bowel sounds  Skin: No rashes, no cyanosis, no lesions or ulcers  Extremities: No cyanosis clubbing or edema.   Feet are examined   POC labs today: No results found for: POCGLUCOSE       Last labs    Lab Results   Component Value Date/Time    CHOLSTRLTOT 246 (H) 09/06/2017 06:43 AM    LDL see below 09/06/2017 06:43 AM    HDL 34 (A) 09/06/2017 06:43 AM    TRIGLYCERIDE 418 (H) 09/06/2017 06:43 AM       Lab Results   Component Value Date/Time    SODIUM 139 09/06/2017 06:43 AM    POTASSIUM 3.7 09/06/2017 06:43 AM    GLUCOSE 45 (LL) 09/06/2017 06:43 AM     Lab Results   Component Value Date/Time    HBA1C 8.4 (H) 09/06/2017 06:43 AM    HBA1C 7.6 (H) 05/16/2017 06:15 AM    HBA1C 9.7 (H) 01/28/2017 08:58 AM     Lab Results   Component Value Date/Time    MALBCRT 107 (H) 05/16/2017 06:15 AM    MICROALBUR 14.1 05/16/2017 06:15 AM          Assessment/Plan:   Medications, refills, and referrals per orders.   1. Type 2 diabetes mellitus without complication, with long-term current use of insulin (CMS-HCC)  insulin glargine (LANTUS SOLOSTAR) 100 UNIT/ML Solution Pen-injector injection    Insulin Pen Needle (BD PEN NEEDLE JOSEP U/F) 32G X 4 MM Misc    NOVOLOG, insulin aspart, (NOVOLOG FLEXPEN) 100 UNIT/ML Solution Pen-injector injection    SitaGLIPtin-MetFORMIN HCl (JANUMET XR)  MG TABLET SR 24 HR    HEMOGLOBIN A1C    LIPID PROFILE    COMP METABOLIC PANEL    MICROALBUMIN CREAT RATIO URINE   2. Hyperlipidemia associated with type 2 diabetes mellitus (CMS-HCC)  atorvastatin (LIPITOR) 40 MG Tab    LIPID PROFILE    COMP METABOLIC PANEL   3. Screening for prostate cancer  PROSTATE SPECIFIC AG DIAGNOSTIC     DM2 A1c is not at goal   Increase Lantus to 30 units, may increase every 3 days until FBS <110 consistently.   Increase novolog to 20 units with meals.   Patient to monitor sugars: at least twice daily frequency  Discussed diet, exercise, disease management and weight loss goals .   Education and advise provided  today:Home glucose monitoring emphasized  Labs immediately prior to next visit  Long term diabetic complications    Followup:   Do labs and RTC 3 months, sooner should new symptoms or problems arise.

## 2018-01-04 ENCOUNTER — OFFICE VISIT (OUTPATIENT)
Dept: URGENT CARE | Facility: PHYSICIAN GROUP | Age: 63
End: 2018-01-04
Payer: COMMERCIAL

## 2018-01-04 VITALS
WEIGHT: 200 LBS | DIASTOLIC BLOOD PRESSURE: 78 MMHG | SYSTOLIC BLOOD PRESSURE: 150 MMHG | HEART RATE: 90 BPM | OXYGEN SATURATION: 94 % | RESPIRATION RATE: 18 BRPM | TEMPERATURE: 98.4 F | BODY MASS INDEX: 31.39 KG/M2 | HEIGHT: 67 IN

## 2018-01-04 DIAGNOSIS — L08.9 INFECTED SEBACEOUS CYST: ICD-10-CM

## 2018-01-04 DIAGNOSIS — L72.3 INFECTED SEBACEOUS CYST: ICD-10-CM

## 2018-01-04 PROCEDURE — 10061 I&D ABSCESS COMP/MULTIPLE: CPT | Performed by: FAMILY MEDICINE

## 2018-01-04 RX ORDER — DOXYCYCLINE HYCLATE 100 MG
100 TABLET ORAL 2 TIMES DAILY
Qty: 20 TAB | Refills: 0 | Status: SHIPPED | OUTPATIENT
Start: 2018-01-04 | End: 2018-01-14

## 2018-01-07 ASSESSMENT — ENCOUNTER SYMPTOMS
FEVER: 0
NAUSEA: 0
CHILLS: 0
JOINT SWELLING: 0

## 2018-01-07 NOTE — PROGRESS NOTES
"Subjective:   Jose Antonio Vann is a 62 y.o. male who presents for Cyst (poss cyst on chest x2 wks)     Cyst   This is a new problem. The current episode started in the past 7 days. The problem occurs constantly. The problem has been rapidly worsening. Pertinent negatives include no chills, fever, joint swelling or nausea.     Review of Systems   Constitutional: Negative for chills and fever.   Gastrointestinal: Negative for nausea.   Musculoskeletal: Negative for joint swelling.      Objective:   /78   Pulse 90   Temp 36.9 °C (98.4 °F)   Resp 18   Ht 1.702 m (5' 7\")   Wt 90.7 kg (200 lb)   SpO2 94%   BMI 31.32 kg/m²   Physical Exam   Constitutional: He is oriented to person, place, and time. He appears well-developed and well-nourished. No distress.   HENT:   Head: Normocephalic and atraumatic.   Eyes: Conjunctivae are normal. Pupils are equal, round, and reactive to light.   Cardiovascular: Normal rate and regular rhythm.    Pulmonary/Chest: Effort normal and breath sounds normal.   Neurological: He is alert and oriented to person, place, and time.   Skin: Skin is warm and dry.        Psychiatric: He has a normal mood and affect. Thought content normal.   Vitals reviewed.       Assessment/Plan:     1. Infected sebaceous cyst  Differential diagnosis, natural history, supportive care, and indications for immediate follow-up discussed.   - doxycycline (VIBRAMYCIN) 100 MG Tab; Take 1 Tab by mouth 2 times a day for 10 days.  Dispense: 20 Tab; Refill: 0     Procedure: Incision and Drainage  -Risks, benefits, and alternatives discussed. Risks including infection, bleeding, nerve damage, and poor cosmetic outcome  -Sterile technique throughout  -Local anesthesia with 2% lidocaine   -Incision with #11 blade into fluctuant area with purulent material expressed  -Cavity probed and any loculations bluntly taken down with hemostat  -Irrigated copiously with NS  -Minimal bleeding with good hemostasis achieved  -The " patient tolerated the procedure well

## 2018-01-10 ENCOUNTER — OFFICE VISIT (OUTPATIENT)
Dept: MEDICAL GROUP | Facility: PHYSICIAN GROUP | Age: 63
End: 2018-01-10
Payer: COMMERCIAL

## 2018-01-10 VITALS
BODY MASS INDEX: 32.8 KG/M2 | HEART RATE: 76 BPM | DIASTOLIC BLOOD PRESSURE: 74 MMHG | TEMPERATURE: 97.9 F | OXYGEN SATURATION: 92 % | SYSTOLIC BLOOD PRESSURE: 128 MMHG | RESPIRATION RATE: 16 BRPM | HEIGHT: 67 IN | WEIGHT: 209 LBS

## 2018-01-10 DIAGNOSIS — L72.3 SEBACEOUS CYST: ICD-10-CM

## 2018-01-10 PROCEDURE — 99213 OFFICE O/P EST LOW 20 MIN: CPT | Performed by: NURSE PRACTITIONER

## 2018-01-11 PROBLEM — L72.3 SEBACEOUS CYST: Status: ACTIVE | Noted: 2018-01-11

## 2018-01-11 NOTE — ASSESSMENT & PLAN NOTE
Patient is here for follow up after having a sebaceous cyst on his chest drained in urgent care on the 4th. He is currently taking doxycycline. He denies any fever or chills. Cysts occur often. Patient is a diabetic and slow to heal.

## 2018-01-11 NOTE — PROGRESS NOTES
Subjective:     Chief Complaint   Patient presents with   • Follow-Up     Cyst        HPI:  Jose Antonio Vann is a 62 y.o. male here today to discuss the following:    Sebaceous cyst  Patient is here for follow up after having a sebaceous cyst on his chest drained in urgent care on the 4th. He is currently taking doxycycline. He denies any fever or chills. Cysts occur often. Patient is a diabetic and slow to heal.     Current medicines (including changes today)  Current Outpatient Prescriptions   Medication Sig Dispense Refill   • doxycycline (VIBRAMYCIN) 100 MG Tab Take 1 Tab by mouth 2 times a day for 10 days. 20 Tab 0   • insulin glargine (LANTUS SOLOSTAR) 100 UNIT/ML Solution Pen-injector injection Inject 30 Units as instructed every evening. 27 mL 3   • Insulin Pen Needle (BD PEN NEEDLE JOSEP U/F) 32G X 4 MM Misc 1 Device by Does not apply route 3 times a day. 270 Each 3   • NOVOLOG, insulin aspart, (NOVOLOG FLEXPEN) 100 UNIT/ML Solution Pen-injector injection Inject 20 Units as instructed 3 times a day before meals. 54 mL 3   • SitaGLIPtin-MetFORMIN HCl (JANUMET XR)  MG TABLET SR 24 HR Take 2 Tabs by mouth every day. 180 Tab 3   • atorvastatin (LIPITOR) 40 MG Tab Take 1 Tab by mouth every day. 90 Tab 3   • lisinopril (PRINIVIL) 10 MG Tab Take 1 Tab by mouth every day. 90 Tab 3   • Insulin Pen Needle (BD PEN NEEDLE JOSEP U/F) 32G X 4 MM Misc 1 Device by Does not apply route 4 times a day. 120 Each 11   • Lancets Misc Lancets order: Lancets for Abbott Freestyle Lite meter. Sig: use daily and prn ssx high or low sugar. #100 RF x 3 100 Each 3   • Blood Glucose Monitoring Suppl Supplies Misc Test strips order: Test strips for Abbott Freestyle Lite meter. Sig: daily use and prn ssx high or low sugar #100 RF x 3 100 Each 3   • Glucose Blood (АННА BLOOD GLUCOSE TEST STRIPS VI) by In Vitro route.     • Blood Glucose Monitoring Suppl SUPPLIES Misc Test strips order: Test strips compatible with meter. Sig: use twice  "daily and prn ssx high or low sugar #200 RF x 3 200 Each 3     No current facility-administered medications for this visit.        He  has a past medical history of Uncontrolled type 2 diabetes mellitus with insulin therapy (CMS-[facility]).    ROS  Review of Systems   Constitutional: Negative for fever, chills, weight loss and malaise/fatigue.   Respiratory: Negative for cough, sputum production, shortness of breath and wheezing.    Cardiovascular: Negative for chest pain, palpitations,  and leg swelling.   Skin: Positive for sebaceous cyst  All other systems reviewed and are negative except as in HPI.   Objective:   Physical Exam:  Blood pressure 128/74, pulse 76, temperature 36.6 °C (97.9 °F), resp. rate 16, height 1.702 m (5' 7\"), weight 94.8 kg (209 lb), SpO2 92 %. Body mass index is 32.73 kg/m².   General:  Well nourished, well developed in NAD  Head is grossly normal.  Neck: Supple without JVD   Pulmonary:  Normal effort.  Cardiovascular: Regular rate   Extremities: no clubbing, cyanosis, or edema.  Skin: Approximately 2\" sebaceous cyst right chest. Able to express large amount of fatty material, but no erythema or purulent drainage.  Health Maintenance Due   Topic Date Due   • IMM ZOSTER VACCINE  01/06/2014   • DIABETES MONOFILAMENT / LE EXAM  01/04/2018     Assessment and Plan:   The following treatment plan was discussed   1. Sebaceous cyst      complete antibiotics, dressing changed. Follow up with PCP       Followup: Return in about 2 weeks (around 1/24/2018) for with PCP sebaceous cyst.   Please note that this dictation was created using voice recognition software. I have made every reasonable attempt to correct obvious errors, but I expect that there are errors of grammar and possibly content that I did not discover before finalizing the note.           "

## 2018-02-24 ENCOUNTER — HOSPITAL ENCOUNTER (OUTPATIENT)
Dept: LAB | Facility: MEDICAL CENTER | Age: 63
End: 2018-02-24
Attending: FAMILY MEDICINE
Payer: COMMERCIAL

## 2018-02-24 DIAGNOSIS — Z79.4 TYPE 2 DIABETES MELLITUS WITHOUT COMPLICATION, WITH LONG-TERM CURRENT USE OF INSULIN (HCC): ICD-10-CM

## 2018-02-24 DIAGNOSIS — E78.5 HYPERLIPIDEMIA ASSOCIATED WITH TYPE 2 DIABETES MELLITUS (HCC): ICD-10-CM

## 2018-02-24 DIAGNOSIS — E11.69 HYPERLIPIDEMIA ASSOCIATED WITH TYPE 2 DIABETES MELLITUS (HCC): ICD-10-CM

## 2018-02-24 DIAGNOSIS — E11.9 TYPE 2 DIABETES MELLITUS WITHOUT COMPLICATION, WITH LONG-TERM CURRENT USE OF INSULIN (HCC): ICD-10-CM

## 2018-02-24 DIAGNOSIS — Z12.5 SCREENING FOR PROSTATE CANCER: ICD-10-CM

## 2018-02-24 LAB
ALBUMIN SERPL BCP-MCNC: 4.4 G/DL (ref 3.2–4.9)
ALBUMIN/GLOB SERPL: 1.5 G/DL
ALP SERPL-CCNC: 79 U/L (ref 30–99)
ALT SERPL-CCNC: 26 U/L (ref 2–50)
ANION GAP SERPL CALC-SCNC: 10 MMOL/L (ref 0–11.9)
AST SERPL-CCNC: 24 U/L (ref 12–45)
BILIRUB SERPL-MCNC: 0.6 MG/DL (ref 0.1–1.5)
BUN SERPL-MCNC: 13 MG/DL (ref 8–22)
CALCIUM SERPL-MCNC: 9.1 MG/DL (ref 8.5–10.5)
CHLORIDE SERPL-SCNC: 105 MMOL/L (ref 96–112)
CHOLEST SERPL-MCNC: 99 MG/DL (ref 100–199)
CO2 SERPL-SCNC: 24 MMOL/L (ref 20–33)
CREAT SERPL-MCNC: 0.73 MG/DL (ref 0.5–1.4)
CREAT UR-MCNC: 160.8 MG/DL
EST. AVERAGE GLUCOSE BLD GHB EST-MCNC: 166 MG/DL
GLOBULIN SER CALC-MCNC: 2.9 G/DL (ref 1.9–3.5)
GLUCOSE SERPL-MCNC: 154 MG/DL (ref 65–99)
HBA1C MFR BLD: 7.4 % (ref 0–5.6)
HDLC SERPL-MCNC: 35 MG/DL
LDLC SERPL CALC-MCNC: 43 MG/DL
MICROALBUMIN UR-MCNC: 7.6 MG/DL
MICROALBUMIN/CREAT UR: 47 MG/G (ref 0–30)
POTASSIUM SERPL-SCNC: 4.1 MMOL/L (ref 3.6–5.5)
PROT SERPL-MCNC: 7.3 G/DL (ref 6–8.2)
PSA SERPL-MCNC: 0.83 NG/ML (ref 0–4)
SODIUM SERPL-SCNC: 139 MMOL/L (ref 135–145)
TRIGL SERPL-MCNC: 104 MG/DL (ref 0–149)

## 2018-02-24 PROCEDURE — 80053 COMPREHEN METABOLIC PANEL: CPT

## 2018-02-24 PROCEDURE — 82570 ASSAY OF URINE CREATININE: CPT

## 2018-02-24 PROCEDURE — 36415 COLL VENOUS BLD VENIPUNCTURE: CPT

## 2018-02-24 PROCEDURE — 80061 LIPID PANEL: CPT

## 2018-02-24 PROCEDURE — 84153 ASSAY OF PSA TOTAL: CPT

## 2018-02-24 PROCEDURE — 82043 UR ALBUMIN QUANTITATIVE: CPT

## 2018-02-24 PROCEDURE — 83036 HEMOGLOBIN GLYCOSYLATED A1C: CPT

## 2018-03-01 ENCOUNTER — OFFICE VISIT (OUTPATIENT)
Dept: MEDICAL GROUP | Facility: PHYSICIAN GROUP | Age: 63
End: 2018-03-01
Payer: COMMERCIAL

## 2018-03-01 VITALS
RESPIRATION RATE: 18 BRPM | SYSTOLIC BLOOD PRESSURE: 126 MMHG | WEIGHT: 205.4 LBS | BODY MASS INDEX: 32.24 KG/M2 | DIASTOLIC BLOOD PRESSURE: 72 MMHG | HEIGHT: 67 IN | HEART RATE: 96 BPM | OXYGEN SATURATION: 92 % | TEMPERATURE: 97.2 F

## 2018-03-01 DIAGNOSIS — L72.3 SEBACEOUS CYST: ICD-10-CM

## 2018-03-01 DIAGNOSIS — E66.9 OBESITY (BMI 35.0-39.9 WITHOUT COMORBIDITY): ICD-10-CM

## 2018-03-01 DIAGNOSIS — I15.2 HYPERTENSION ASSOCIATED WITH DIABETES (HCC): ICD-10-CM

## 2018-03-01 DIAGNOSIS — E11.29 TYPE 2 DIABETES MELLITUS WITH MICROALBUMINURIA, WITH LONG-TERM CURRENT USE OF INSULIN (HCC): ICD-10-CM

## 2018-03-01 DIAGNOSIS — R80.9 TYPE 2 DIABETES MELLITUS WITH MICROALBUMINURIA, WITH LONG-TERM CURRENT USE OF INSULIN (HCC): ICD-10-CM

## 2018-03-01 DIAGNOSIS — E11.59 HYPERTENSION ASSOCIATED WITH DIABETES (HCC): ICD-10-CM

## 2018-03-01 DIAGNOSIS — Z79.4 TYPE 2 DIABETES MELLITUS WITH MICROALBUMINURIA, WITH LONG-TERM CURRENT USE OF INSULIN (HCC): ICD-10-CM

## 2018-03-01 DIAGNOSIS — E78.00 PURE HYPERCHOLESTEROLEMIA: ICD-10-CM

## 2018-03-01 PROCEDURE — 99214 OFFICE O/P EST MOD 30 MIN: CPT | Performed by: FAMILY MEDICINE

## 2018-03-01 RX ORDER — LISINOPRIL 20 MG/1
20 TABLET ORAL DAILY
Qty: 90 TAB | Refills: 3 | Status: SHIPPED | OUTPATIENT
Start: 2018-03-01 | End: 2018-09-13 | Stop reason: SDUPTHER

## 2018-03-02 NOTE — PROGRESS NOTES
Diabetes Focused Exam:    Chief Complaint   Patient presents with   • Diabetes Mellitus     fv labs   • Hypertension      Subjective:   PEG Vann is a 62 y.o. male who presents for follow up of chronic conditions of diabetes mellitus, hypertension,  and hyperlipidemia. He indicates that he is feeling well and denies any symptoms referable to the above diagnoses. Specifically denies chest pain, palpitations, dyspnea, orthopnea, PND or peripheral edema. Also denies polyuria, polydipsia, urinary complaints, abdominal complaints, myalgias, numbness, weakness or other related symptoms.   The patient is taking ASA every day  and taking all other medications as prescribed . Patient denies any side effects of medication.  DM: A1c goal <7  Glucose monitoring frequency: daily   Fasting sugars: n/a. Post-prandial sugars: n/a  Hypoglycemic episodes no symptoms   Diabetic complications: nephropathy  ACR Albumin/Creatinine Ratio goal <30   HTN: Blood pressure goal <140/<80 . Currently Rx ACE/ARB: Yes  Hyperlipidemia:Cholesterol goal LDL <100, total/HDL <5 . Currently Rx Statin: Yes  Last eye exam UTD  Denies visual blurring, double vision, eye pain and floaters  Last monofilament foot exam: today. Denies foot pain, numbness, calluses, ulcers      See medications and orders placed in encounter report.  Past medical history, family history, social history reviewed and updated as documented in medical record.  Current medications including changes today:  Current Outpatient Prescriptions   Medication Sig Dispense Refill   • lisinopril (PRINIVIL) 20 MG Tab Take 1 Tab by mouth every day. 90 Tab 3   • insulin glargine (LANTUS SOLOSTAR) 100 UNIT/ML Solution Pen-injector injection Inject 30 Units as instructed every evening. 27 mL 3   • Insulin Pen Needle (BD PEN NEEDLE JOSEP U/F) 32G X 4 MM Misc 1 Device by Does not apply route 3 times a day. 270 Each 3   • NOVOLOG, insulin aspart, (NOVOLOG FLEXPEN) 100 UNIT/ML Solution  "Pen-injector injection Inject 20 Units as instructed 3 times a day before meals. 54 mL 3   • SitaGLIPtin-MetFORMIN HCl (JANUMET XR)  MG TABLET SR 24 HR Take 2 Tabs by mouth every day. 180 Tab 3   • atorvastatin (LIPITOR) 40 MG Tab Take 1 Tab by mouth every day. 90 Tab 3   • Insulin Pen Needle (BD PEN NEEDLE JOSEP U/F) 32G X 4 MM Misc 1 Device by Does not apply route 4 times a day. 120 Each 11   • Lancets Misc Lancets order: Lancets for Abbott Freestyle Lite meter. Sig: use daily and prn ssx high or low sugar. #100 RF x 3 100 Each 3   • Blood Glucose Monitoring Suppl Supplies Misc Test strips order: Test strips for Abbott Freestyle Lite meter. Sig: daily use and prn ssx high or low sugar #100 RF x 3 100 Each 3   • Glucose Blood (АННА BLOOD GLUCOSE TEST STRIPS VI) by In Vitro route.     • Blood Glucose Monitoring Suppl SUPPLIES Misc Test strips order: Test strips compatible with meter. Sig: use twice daily and prn ssx high or low sugar #200 RF x 3 200 Each 3     No current facility-administered medications for this visit.      Allergies: No Known Allergies   Social History   Substance Use Topics   • Smoking status: Never Smoker   • Smokeless tobacco: Never Used   • Alcohol use No     Exercise: regular walking  Health Maintenance/Immunizations: UTD and reviewed    ROS  Pertinent  ROS findings as above. All other systems reviewed and are negative.      Objective:     OBJECTIVE:  /72   Pulse 96   Temp 36.2 °C (97.2 °F)   Resp 18   Ht 1.702 m (5' 7\")   Wt 93.2 kg (205 lb 6.4 oz)   SpO2 92%   BMI 32.17 kg/m²  Body mass index is 32.17 kg/m². BMI: moderately obese  General: No apparent distress, conversant, cooperative and pleasant with the examination.  Psych: Alert and oriented x4, judgment and insight normal  Neck: No JVD or bruits, no adenopathy, supple  Thyroid: normal to inspection and palpation  Lungs: negative  Heart: negative  Abdomen: Soft, nontender, no hepatosplenomegaly or masses, normal bowel " sounds  Skin: No rashes, no cyanosis, no lesions or ulcers  Extremities: No cyanosis clubbing or edema.   Feet are examined  Monofilament testing with a 10 gram force: sensation: intact bilaterally  Visual Inspection: Feet without maceration, ulcers, or fissures.  Pedal pulses: intact bilaterally    POC labs today: No results found for: POCGLUCOSE       Last labs    Lab Results   Component Value Date/Time    CHOLSTRLTOT 99 (L) 02/24/2018 08:33 AM    LDL 43 02/24/2018 08:33 AM    HDL 35 (A) 02/24/2018 08:33 AM    TRIGLYCERIDE 104 02/24/2018 08:33 AM       Lab Results   Component Value Date/Time    SODIUM 139 02/24/2018 08:33 AM    POTASSIUM 4.1 02/24/2018 08:33 AM    GLUCOSE 154 (H) 02/24/2018 08:33 AM     Lab Results   Component Value Date/Time    HBA1C 7.4 (H) 02/24/2018 08:33 AM    HBA1C 8.4 (H) 09/06/2017 06:43 AM    HBA1C 7.6 (H) 05/16/2017 06:15 AM     Lab Results   Component Value Date/Time    MALBCRT 47 (H) 02/24/2018 08:33 AM    MICROALBUR 7.6 02/24/2018 08:33 AM          Assessment/Plan:   Medications, refills, and referrals per orders.   1. Type 2 diabetes mellitus with microalbuminuria, with long-term current use of insulin (CMS-HCC)  Diabetic Monofilament Lower Extremity Exam    lisinopril (PRINIVIL) 20 MG Tab    HEMOGLOBIN A1C    LIPID PROFILE    COMP METABOLIC PANEL    MICROALBUMIN CREAT RATIO URINE   2. Hypertension associated with diabetes (CMS-HCC)  LIPID PROFILE    COMP METABOLIC PANEL   3. Pure hypercholesterolemia  LIPID PROFILE    COMP METABOLIC PANEL   4. Sebaceous cyst     5. Obesity (BMI 35.0-39.9 without comorbidity)         Will observe the sebaceous cyst.   Increase lisinopril to 20 mg  DM2 A1c is at goal   Patient to monitor sugars: daily frequency  Discussed diet, exercise, disease management and weight loss goals .   Education and advise provided today:Glycohemoglobin and other lab monitoring  Home glucose monitoring emphasized  Labs immediately prior to next visit  Long term diabetic  complications  Low cholesterol diet, weight control and daily exercise    Followup:   Do labs and RTC 3 months, sooner should new symptoms or problems arise.

## 2018-08-25 ENCOUNTER — HOSPITAL ENCOUNTER (OUTPATIENT)
Dept: LAB | Facility: MEDICAL CENTER | Age: 63
End: 2018-08-25
Attending: FAMILY MEDICINE
Payer: COMMERCIAL

## 2018-08-25 DIAGNOSIS — E11.29 TYPE 2 DIABETES MELLITUS WITH MICROALBUMINURIA, WITH LONG-TERM CURRENT USE OF INSULIN (HCC): ICD-10-CM

## 2018-08-25 DIAGNOSIS — E78.00 PURE HYPERCHOLESTEROLEMIA: ICD-10-CM

## 2018-08-25 DIAGNOSIS — E11.59 HYPERTENSION ASSOCIATED WITH DIABETES (HCC): ICD-10-CM

## 2018-08-25 DIAGNOSIS — Z79.4 TYPE 2 DIABETES MELLITUS WITH MICROALBUMINURIA, WITH LONG-TERM CURRENT USE OF INSULIN (HCC): ICD-10-CM

## 2018-08-25 DIAGNOSIS — R80.9 TYPE 2 DIABETES MELLITUS WITH MICROALBUMINURIA, WITH LONG-TERM CURRENT USE OF INSULIN (HCC): ICD-10-CM

## 2018-08-25 DIAGNOSIS — I15.2 HYPERTENSION ASSOCIATED WITH DIABETES (HCC): ICD-10-CM

## 2018-08-25 LAB
ALBUMIN SERPL BCP-MCNC: 4.1 G/DL (ref 3.2–4.9)
ALBUMIN/GLOB SERPL: 1.5 G/DL
ALP SERPL-CCNC: 77 U/L (ref 30–99)
ALT SERPL-CCNC: 30 U/L (ref 2–50)
ANION GAP SERPL CALC-SCNC: 6 MMOL/L (ref 0–11.9)
AST SERPL-CCNC: 25 U/L (ref 12–45)
BILIRUB SERPL-MCNC: 0.6 MG/DL (ref 0.1–1.5)
BUN SERPL-MCNC: 14 MG/DL (ref 8–22)
CALCIUM SERPL-MCNC: 9.2 MG/DL (ref 8.5–10.5)
CHLORIDE SERPL-SCNC: 105 MMOL/L (ref 96–112)
CHOLEST SERPL-MCNC: 134 MG/DL (ref 100–199)
CO2 SERPL-SCNC: 27 MMOL/L (ref 20–33)
CREAT SERPL-MCNC: 0.91 MG/DL (ref 0.5–1.4)
CREAT UR-MCNC: 376.6 MG/DL
EST. AVERAGE GLUCOSE BLD GHB EST-MCNC: 171 MG/DL
GLOBULIN SER CALC-MCNC: 2.8 G/DL (ref 1.9–3.5)
GLUCOSE SERPL-MCNC: 91 MG/DL (ref 65–99)
HBA1C MFR BLD: 7.6 % (ref 0–5.6)
HDLC SERPL-MCNC: 36 MG/DL
LDLC SERPL CALC-MCNC: 66 MG/DL
MICROALBUMIN UR-MCNC: 16.5 MG/DL
MICROALBUMIN/CREAT UR: 44 MG/G (ref 0–30)
POTASSIUM SERPL-SCNC: 4.9 MMOL/L (ref 3.6–5.5)
PROT SERPL-MCNC: 6.9 G/DL (ref 6–8.2)
SODIUM SERPL-SCNC: 138 MMOL/L (ref 135–145)
TRIGL SERPL-MCNC: 162 MG/DL (ref 0–149)

## 2018-08-25 PROCEDURE — 83036 HEMOGLOBIN GLYCOSYLATED A1C: CPT

## 2018-08-25 PROCEDURE — 82043 UR ALBUMIN QUANTITATIVE: CPT

## 2018-08-25 PROCEDURE — 36415 COLL VENOUS BLD VENIPUNCTURE: CPT

## 2018-08-25 PROCEDURE — 80061 LIPID PANEL: CPT

## 2018-08-25 PROCEDURE — 82570 ASSAY OF URINE CREATININE: CPT

## 2018-08-25 PROCEDURE — 80053 COMPREHEN METABOLIC PANEL: CPT

## 2018-09-13 ENCOUNTER — OFFICE VISIT (OUTPATIENT)
Dept: MEDICAL GROUP | Facility: PHYSICIAN GROUP | Age: 63
End: 2018-09-13
Payer: COMMERCIAL

## 2018-09-13 VITALS
BODY MASS INDEX: 32.02 KG/M2 | SYSTOLIC BLOOD PRESSURE: 120 MMHG | RESPIRATION RATE: 16 BRPM | HEIGHT: 67 IN | WEIGHT: 204 LBS | OXYGEN SATURATION: 93 % | TEMPERATURE: 98.2 F | HEART RATE: 78 BPM | DIASTOLIC BLOOD PRESSURE: 82 MMHG

## 2018-09-13 DIAGNOSIS — E11.69 HYPERLIPIDEMIA ASSOCIATED WITH TYPE 2 DIABETES MELLITUS (HCC): ICD-10-CM

## 2018-09-13 DIAGNOSIS — E78.00 PURE HYPERCHOLESTEROLEMIA: ICD-10-CM

## 2018-09-13 DIAGNOSIS — R80.9 TYPE 2 DIABETES MELLITUS WITH MICROALBUMINURIA, WITH LONG-TERM CURRENT USE OF INSULIN (HCC): ICD-10-CM

## 2018-09-13 DIAGNOSIS — I15.2 HYPERTENSION ASSOCIATED WITH DIABETES (HCC): ICD-10-CM

## 2018-09-13 DIAGNOSIS — E66.9 OBESITY (BMI 35.0-39.9 WITHOUT COMORBIDITY): ICD-10-CM

## 2018-09-13 DIAGNOSIS — E78.5 HYPERLIPIDEMIA ASSOCIATED WITH TYPE 2 DIABETES MELLITUS (HCC): ICD-10-CM

## 2018-09-13 DIAGNOSIS — E11.29 TYPE 2 DIABETES MELLITUS WITH MICROALBUMINURIA, WITH LONG-TERM CURRENT USE OF INSULIN (HCC): ICD-10-CM

## 2018-09-13 DIAGNOSIS — E11.59 HYPERTENSION ASSOCIATED WITH DIABETES (HCC): ICD-10-CM

## 2018-09-13 DIAGNOSIS — Z79.4 TYPE 2 DIABETES MELLITUS WITH MICROALBUMINURIA, WITH LONG-TERM CURRENT USE OF INSULIN (HCC): ICD-10-CM

## 2018-09-13 PROCEDURE — 99214 OFFICE O/P EST MOD 30 MIN: CPT | Performed by: FAMILY MEDICINE

## 2018-09-13 RX ORDER — LISINOPRIL 20 MG/1
20 TABLET ORAL DAILY
Qty: 90 TAB | Refills: 3 | Status: SHIPPED | OUTPATIENT
Start: 2018-09-13

## 2018-09-13 RX ORDER — ATORVASTATIN CALCIUM 40 MG/1
40 TABLET, FILM COATED ORAL DAILY
Qty: 90 TAB | Refills: 3 | Status: SHIPPED | OUTPATIENT
Start: 2018-09-13 | End: 2019-11-13 | Stop reason: SDUPTHER

## 2018-09-13 NOTE — PROGRESS NOTES
"Diabetes Focused Exam:    Chief Complaint   Patient presents with   • Diabetes     fv labs      Subjective:   PEG Vann is a 62 y.o. male who presents for follow up of chronic conditions of diabetes mellitus, hypertension,  and hyperlipidemia. He indicates that he is feeling well and denies any symptoms referable to the above diagnoses. Specifically denies chest pain, palpitations, dyspnea, orthopnea, PND or peripheral edema. Also denies polyuria, polydipsia, urinary complaints, abdominal complaints, myalgias, numbness, weakness or other related symptoms.   The patient is taking ASA every day  and taking all other medications as prescribed but they are getting to be unaffordable due to new insurance. Patient denies any side effects of medication.  DM: A1c goal <7  Glucose monitoring frequency: daily   Fasting sugars: 140s. Post-prandial sugars: n/a  Hypoglycemic episodes none  Diabetic complications: nephropathy and peripheral neuropathy  ACR Albumin/Creatinine Ratio goal <30   HTN: Blood pressure goal <140/<80 . Currently Rx ACE/ARB: Yes  Hyperlipidemia:Cholesterol goal LDL <100, total/HDL <5 . Currently Rx Statin: Yes  Last eye exam   Denies visual blurring, double vision, eye pain and floaters  Last monofilament foot exam:  Denies foot pain, + numbness, calluses, ulcers      See medications and orders placed in encounter report.  Past medical history, family history, social history reviewed and updated as documented in medical record.  Current medications including changes today:  Current Outpatient Prescriptions   Medication Sig Dispense Refill   • atorvastatin (LIPITOR) 40 MG Tab Take 1 Tab by mouth every day. 90 Tab 3   • insulin 70/30 (NOVOLIN 70/30 RELION) (70-30) 100 UNIT/ML Suspension Inject 20 Units as instructed 2 Times a Day for 30 days. With Breakfast and with Dinner 12 mL 11   • Insulin Syringe-Needle U-100 (BD INSULIN SYRINGE ULTRAFINE) 31G X 15/64\" 1 ML Misc 1 Device by Does not apply " "route 2 Times a Day. 60 Each 11   • lisinopril (PRINIVIL) 20 MG Tab Take 1 Tab by mouth every day. 90 Tab 3   • metformin (GLUCOPHAGE) 1000 MG tablet Take 1 Tab by mouth 2 times a day, with meals for 360 days. 60 Tab 11   • Insulin Pen Needle (BD PEN NEEDLE JOSEP U/F) 32G X 4 MM Misc 1 Device by Does not apply route 3 times a day. 270 Each 3   • Insulin Pen Needle (BD PEN NEEDLE JOSEP U/F) 32G X 4 MM Misc 1 Device by Does not apply route 4 times a day. 120 Each 11   • Lancets Misc Lancets order: Lancets for Abbott Freestyle Lite meter. Sig: use daily and prn ssx high or low sugar. #100 RF x 3 100 Each 3   • Blood Glucose Monitoring Suppl Supplies Misc Test strips order: Test strips for Abbott Freestyle Lite meter. Sig: daily use and prn ssx high or low sugar #100 RF x 3 100 Each 3   • Glucose Blood (АННА BLOOD GLUCOSE TEST STRIPS VI) by In Vitro route.     • Blood Glucose Monitoring Suppl SUPPLIES Misc Test strips order: Test strips compatible with meter. Sig: use twice daily and prn ssx high or low sugar #200 RF x 3 200 Each 3     No current facility-administered medications for this visit.      Allergies: No Known Allergies   Social History   Substance Use Topics   • Smoking status: Never Smoker   • Smokeless tobacco: Never Used   • Alcohol use No     Exercise: minimal  Health Maintenance/Immunizations: due for flu    ROS  Pertinent  ROS findings as above. All other systems reviewed and are negative.      Objective:     OBJECTIVE:  /82   Pulse 78   Temp 36.8 °C (98.2 °F)   Resp 16   Ht 1.702 m (5' 7\")   Wt 92.5 kg (204 lb)   SpO2 93%   BMI 31.95 kg/m²  Body mass index is 31.95 kg/m². BMI: mildly obese  General: No apparent distress, conversant, cooperative and pleasant with the examination.  Psych: Alert and oriented x4, judgment and insight normal  Neck: No JVD or bruits, no adenopathy, supple  Thyroid: normal to inspection and palpation  Lungs: negative  Heart: negative  Abdomen: Soft, nontender, no " "hepatosplenomegaly or masses, normal bowel sounds  Skin: No rashes, no cyanosis, no lesions or ulcers  Extremities: No cyanosis clubbing or edema.   Feet are examined   POC labs today: No results found for: POCGLUCOSE       Last labs    Lab Results   Component Value Date/Time    CHOLSTRLTOT 134 08/25/2018 09:27 AM    LDL 66 08/25/2018 09:27 AM    HDL 36 (A) 08/25/2018 09:27 AM    TRIGLYCERIDE 162 (H) 08/25/2018 09:27 AM       Lab Results   Component Value Date/Time    SODIUM 138 08/25/2018 09:27 AM    POTASSIUM 4.9 08/25/2018 09:27 AM    GLUCOSE 91 08/25/2018 09:27 AM     Lab Results   Component Value Date/Time    HBA1C 7.6 (H) 08/25/2018 09:27 AM    HBA1C 7.4 (H) 02/24/2018 08:33 AM    HBA1C 8.4 (H) 09/06/2017 06:43 AM     Lab Results   Component Value Date/Time    MALBCRT 44 (H) 08/25/2018 09:27 AM    MICROALBUR 16.5 08/25/2018 09:27 AM          Assessment/Plan:   Medications, refills, and referrals per orders.   1. Type 2 diabetes mellitus with microalbuminuria, with long-term current use of insulin ([facility])  insulin 70/30 (NOVOLIN 70/30 RELION) (70-30) 100 UNIT/ML Suspension    Insulin Syringe-Needle U-100 (BD INSULIN SYRINGE ULTRAFINE) 31G X 15/64\" 1 ML Misc    lisinopril (PRINIVIL) 20 MG Tab    metformin (GLUCOPHAGE) 1000 MG tablet    DISCONTINUED: insulin 70/30 (NOVOLIN 70/30 RELION) (70-30) 100 UNIT/ML Suspension    DISCONTINUED: Insulin Syringe-Needle U-100 (BD INSULIN SYRINGE ULTRAFINE) 31G X 15/64\" 1 ML Misc    DISCONTINUED: metformin (GLUCOPHAGE) 1000 MG tablet   2. Pure hypercholesterolemia     3. Hypertension associated with diabetes (HCC)     4. Obesity (BMI 35.0-39.9 without comorbidity)  Patient identified as having weight management issue.  Appropriate orders and counseling given.   5. Hyperlipidemia associated with type 2 diabetes mellitus (HCC)  atorvastatin (LIPITOR) 40 MG Tab     DM2 A1c is not at goal   Patient to monitor sugars: daily/pre and post meals frequency  Discussed diet, exercise, " disease management and weight loss goals .   Education and advise provided today:All medications, side effects and compliance (discussed carefully)  Home glucose monitoring emphasized    Followup:   Do labs and RTC 3 months, A1c in office.  Will use up the lantus and novolog and janument, then change to Metformin and 70/30 for cost effective.    sooner should new symptoms or problems arise.

## 2018-09-18 DIAGNOSIS — Z79.4 TYPE 2 DIABETES MELLITUS WITHOUT COMPLICATION, WITH LONG-TERM CURRENT USE OF INSULIN (HCC): ICD-10-CM

## 2018-09-18 DIAGNOSIS — E11.9 TYPE 2 DIABETES MELLITUS WITHOUT COMPLICATION, WITH LONG-TERM CURRENT USE OF INSULIN (HCC): ICD-10-CM

## 2018-09-18 DIAGNOSIS — Z79.4 TYPE 2 DIABETES MELLITUS WITH HYPEROSMOLAR COMA, WITH LONG-TERM CURRENT USE OF INSULIN (HCC): ICD-10-CM

## 2018-09-18 DIAGNOSIS — E11.01 TYPE 2 DIABETES MELLITUS WITH HYPEROSMOLAR COMA, WITH LONG-TERM CURRENT USE OF INSULIN (HCC): ICD-10-CM

## 2018-09-18 NOTE — TELEPHONE ENCOUNTER
Was the patient seen in the last year in this department? Yes    Does patient have an active prescription for medications requested? No     Received Request Via: Pharmacy      Pt met protocol?: Yes, OV last week. Both insulins being requested by the patient have been d/c'd. Only current insulin is Novolin 70/30.   Lab Results   Component Value Date/Time    HBA1C 7.6 (H) 08/25/2018 09:27 AM

## 2018-09-20 ENCOUNTER — TELEPHONE (OUTPATIENT)
Dept: MEDICAL GROUP | Facility: PHYSICIAN GROUP | Age: 63
End: 2018-09-20

## 2018-09-20 RX ORDER — INSULIN GLARGINE 100 [IU]/ML
25 INJECTION, SOLUTION SUBCUTANEOUS EVERY EVENING
Qty: 10 ML | Refills: 0 | OUTPATIENT
Start: 2018-09-20

## 2018-09-20 NOTE — TELEPHONE ENCOUNTER
Daughter, Tonia, was able to get Jose Antonio on a Patient Assistance Program for Janumet.  This was previously changed from Janumet to Metformin.  Can you please put in a new prescription so this can be attached to the form.  Thank you.

## 2018-09-20 NOTE — TELEPHONE ENCOUNTER
Pt no longer on these insulins and pt was changed to metformin and Novolin 70/30. Pharmacy aware.

## 2018-10-23 ENCOUNTER — TELEPHONE (OUTPATIENT)
Dept: MEDICAL GROUP | Facility: PHYSICIAN GROUP | Age: 63
End: 2018-10-23

## 2018-10-23 NOTE — TELEPHONE ENCOUNTER
1. Caller Name: Tonia (Daughter)                                         Call Back Number: [phone] (home)        Patient approves a detailed voicemail message: N\A    Pt daughter called and said paperwork filld out for [employer] Patient Assistance Program was incorrect. Rx for JANUNET XR sig wsa written for 1 TAB PO QD instead of 2 TAB PO QD.      Program stated it was okay to give a verbal. Please advise.      **[phone] opt. 3**

## 2018-11-28 ENCOUNTER — TELEPHONE (OUTPATIENT)
Dept: MEDICAL GROUP | Facility: PHYSICIAN GROUP | Age: 63
End: 2018-11-28

## 2018-11-28 NOTE — TELEPHONE ENCOUNTER
1. Caller Name: Tonia (Daughter)                                         Call Back Number: [phone]      Patient approves a detailed voicemail message: N\A    Pt was advised to monitior blood sugar levels since x Janument dosage was increased and since then the pts fasting glucose level has ranged between 180-200. Please advise

## 2018-11-30 ENCOUNTER — TELEPHONE (OUTPATIENT)
Dept: MEDICAL GROUP | Facility: PHYSICIAN GROUP | Age: 63
End: 2018-11-30

## 2018-11-30 NOTE — TELEPHONE ENCOUNTER
· Patient Assistance for Amee Nordisk(70/30) & [employer]of[employer] (Lantus Solostar) paperwork received from patient's daughter, Tonia, requiring provider signature.     · All appropriate fields completed by Medical Assistant: No    · Paperwork placed in MA folder/basket to process.     Daughter was advised that the patient should not be on both Lantus Solostar and Novolin (Relion 70/30).  Daughter was a little confused.  I explained that the patient should be on either Lantus and Novolog or Relion 70/30.  The daughter then understood.  He is going to go on the 70/30.  The patient was still on the Lantus because he still had some.  Paperwork for the 70/30 filled out.  Scanned into his chart and left up front for daughter, Tonia, to .

## 2018-12-13 NOTE — TELEPHONE ENCOUNTER
1. Caller Name: Tonia                                          Call Back Number: [phone] (home)        Patient approves a detailed voicemail message: N\A    Pts wife called [employer] that the order for Novolin specififcally needed to state Novolin Mix 70/30 or Novolin R. They can't have it state Novolin (Relion 70/30) because it won't be processed correctly.       Call pharm [phone] and give verbal

## 2019-01-29 ENCOUNTER — OFFICE VISIT (OUTPATIENT)
Dept: MEDICAL GROUP | Facility: PHYSICIAN GROUP | Age: 64
End: 2019-01-29
Payer: COMMERCIAL

## 2019-01-29 VITALS
OXYGEN SATURATION: 97 % | SYSTOLIC BLOOD PRESSURE: 120 MMHG | DIASTOLIC BLOOD PRESSURE: 76 MMHG | HEIGHT: 67 IN | WEIGHT: 200.6 LBS | HEART RATE: 76 BPM | TEMPERATURE: 97.5 F | BODY MASS INDEX: 31.48 KG/M2 | RESPIRATION RATE: 14 BRPM

## 2019-01-29 DIAGNOSIS — E78.00 PURE HYPERCHOLESTEROLEMIA: ICD-10-CM

## 2019-01-29 DIAGNOSIS — E11.59 HYPERTENSION ASSOCIATED WITH DIABETES (HCC): ICD-10-CM

## 2019-01-29 DIAGNOSIS — I15.2 HYPERTENSION ASSOCIATED WITH DIABETES (HCC): ICD-10-CM

## 2019-01-29 DIAGNOSIS — Z23 NEED FOR VACCINATION: ICD-10-CM

## 2019-01-29 DIAGNOSIS — E11.9 CONTROLLED TYPE 2 DIABETES MELLITUS WITHOUT COMPLICATION, WITH LONG-TERM CURRENT USE OF INSULIN (HCC): ICD-10-CM

## 2019-01-29 DIAGNOSIS — Z79.4 CONTROLLED TYPE 2 DIABETES MELLITUS WITHOUT COMPLICATION, WITH LONG-TERM CURRENT USE OF INSULIN (HCC): ICD-10-CM

## 2019-01-29 PROCEDURE — 90670 PCV13 VACCINE IM: CPT | Performed by: FAMILY MEDICINE

## 2019-01-29 PROCEDURE — 90471 IMMUNIZATION ADMIN: CPT | Performed by: FAMILY MEDICINE

## 2019-01-29 PROCEDURE — 99214 OFFICE O/P EST MOD 30 MIN: CPT | Mod: 25 | Performed by: FAMILY MEDICINE

## 2019-01-29 PROCEDURE — 90662 IIV NO PRSV INCREASED AG IM: CPT | Performed by: FAMILY MEDICINE

## 2019-01-29 PROCEDURE — 90472 IMMUNIZATION ADMIN EACH ADD: CPT | Performed by: FAMILY MEDICINE

## 2019-01-29 ASSESSMENT — PATIENT HEALTH QUESTIONNAIRE - PHQ9: CLINICAL INTERPRETATION OF PHQ2 SCORE: 0

## 2019-01-29 NOTE — PROGRESS NOTES
Diabetes Focused Exam:    Chief Complaint   Patient presents with   • Diabetes      Subjective:   PEG Vann is a 63 y.o. male who presents for follow up of chronic conditions of diabetes mellitus, hypertension,  and hyperlipidemia. He indicates that he is feeling well and denies any symptoms referable to the above diagnoses. Specifically denies chest pain, palpitations, dyspnea, orthopnea, PND or peripheral edema. Also denies polyuria, polydipsia, urinary complaints, abdominal complaints, myalgias, numbness, weakness or other related symptoms.   The patient is taking ASA every day  and taking all other medications as prescribed . Patient denies any side effects of medication.  DM: A1c goal <7  Glucose monitoring frequency: BID at least    Fasting sugars: 116-200. Post-prandial sugars: 200-400s  Hypoglycemic episodes none  Diabetic complications: nephropathy  ACR Albumin/Creatinine Ratio goal <30   HTN: Blood pressure goal <140/<80 . Currently Rx ACE/ARB: Yes  Hyperlipidemia:Cholesterol goal LDL <100, total/HDL <5 . Currently Rx Statin: Yes  Last eye exam Due  Denies visual blurring, double vision, eye pain and floaters  Last monofilament foot exam:  Denies foot pain, numbness, calluses, ulcers      See medications and orders placed in encounter report.  Past medical history, family history, social history reviewed and updated as documented in medical record.  Current medications including changes today:  Current Outpatient Prescriptions   Medication Sig Dispense Refill   • insulin 70/30 (HUMULIN,NOVOLIN) (70-30) 100 UNIT/ML Suspension Inject  as instructed.     • Insulin Degludec (TRESIBA FLEXTOUCH) 100 UNIT/ML Solution Pen-injector Inject 25 Units as instructed every day for 360 days. 3 PEN 11   • SitaGLIPtin-MetFORMIN HCl (JANUMET XR) 100-1000 MG TABLET SR 24 HR Take 1 Tab by mouth every day. 20 minutes before dinner 90 Tab 3   • atorvastatin (LIPITOR) 40 MG Tab Take 1 Tab by mouth every day. 90 Tab 3  "  • Insulin Syringe-Needle U-100 (BD INSULIN SYRINGE ULTRAFINE) 31G X 15/64\" 1 ML Misc 1 Device by Does not apply route 2 Times a Day. 60 Each 11   • lisinopril (PRINIVIL) 20 MG Tab Take 1 Tab by mouth every day. 90 Tab 3   • Insulin Pen Needle (BD PEN NEEDLE JOSEP U/F) 32G X 4 MM Misc 1 Device by Does not apply route 3 times a day. 270 Each 3   • Insulin Pen Needle (BD PEN NEEDLE JOSEP U/F) 32G X 4 MM Misc 1 Device by Does not apply route 4 times a day. 120 Each 11   • Lancets Misc Lancets order: Lancets for Abbott Freestyle Lite meter. Sig: use daily and prn ssx high or low sugar. #100 RF x 3 100 Each 3   • Blood Glucose Monitoring Suppl Supplies Misc Test strips order: Test strips for Abbott Freestyle Lite meter. Sig: daily use and prn ssx high or low sugar #100 RF x 3 100 Each 3   • Glucose Blood (АННА BLOOD GLUCOSE TEST STRIPS VI) by In Vitro route.     • Blood Glucose Monitoring Suppl SUPPLIES Misc Test strips order: Test strips compatible with meter. Sig: use twice daily and prn ssx high or low sugar #200 RF x 3 200 Each 3     No current facility-administered medications for this visit.      Allergies: No Known Allergies   Social History   Substance Use Topics   • Smoking status: Never Smoker   • Smokeless tobacco: Never Used   • Alcohol use No     Exercise: minimal   Health Maintenance/Immunizations: reviewed and updated.     ROS  Pertinent  ROS findings as above. All other systems reviewed and are negative.      Objective:     OBJECTIVE:  /76   Pulse 76   Temp 36.4 °C (97.5 °F)   Resp 14   Ht 1.702 m (5' 7\")   Wt 91 kg (200 lb 9.6 oz)   SpO2 97%   BMI 31.42 kg/m²  Body mass index is 31.42 kg/m². BMI: mildly obese  General: No apparent distress, conversant, cooperative and pleasant with the examination.  Psych: Alert and oriented x4, judgment and insight normal  Neck: No JVD or bruits, no adenopathy, supple  Thyroid: normal to inspection and palpation  Lungs: negative  Heart: negative  Abdomen: " Soft, nontender, no hepatosplenomegaly or masses, normal bowel sounds  Skin: No rashes, no cyanosis, no lesions or ulcers  Extremities: No cyanosis clubbing or edema.   Feet are examined   POC labs today: No results found for: POCGLUCOSE       Last labs    Lab Results   Component Value Date/Time    CHOLSTRLTOT 134 08/25/2018 09:27 AM    LDL 66 08/25/2018 09:27 AM    HDL 36 (A) 08/25/2018 09:27 AM    TRIGLYCERIDE 162 (H) 08/25/2018 09:27 AM       Lab Results   Component Value Date/Time    SODIUM 138 08/25/2018 09:27 AM    POTASSIUM 4.9 08/25/2018 09:27 AM    GLUCOSE 91 08/25/2018 09:27 AM     Lab Results   Component Value Date/Time    HBA1C 7.6 (H) 08/25/2018 09:27 AM    HBA1C 7.4 (H) 02/24/2018 08:33 AM    HBA1C 8.4 (H) 09/06/2017 06:43 AM     Lab Results   Component Value Date/Time    MALBCRT 44 (H) 08/25/2018 09:27 AM    MICROALBUR 16.5 08/25/2018 09:27 AM          Assessment/Plan:   Medications, refills, and referrals per orders.   1. Controlled type 2 diabetes mellitus without complication, with long-term current use of insulin (HCC)  HEMOGLOBIN A1C    Lipid Profile    MICROALBUMIN CREAT RATIO URINE    COMP METABOLIC PANEL    Insulin Degludec (TRESIBA FLEXTOUCH) 100 UNIT/ML Solution Pen-injector   2. Hypertension associated with diabetes (HCC)     3. Pure hypercholesterolemia     4. Need for vaccination  Influenza Vaccine, High Dose (65+ Only)    Prevnar 13 PCV-13     Will attempt to change to Tresiba for better continued coverage.  Discussed frequent small meals.  If unable to get the Tresiba, we will stick with the 70/30 and increase to 25 units BID.  Consider Vicotza instead of Janumet    DM2 A1c is not at goal   Patient to monitor sugars: TID-QID frequency  Discussed diet, exercise, disease management and weight loss goals .   Education and advise provided today:All medications, side effects and compliance (discussed carefully)  Annual eye examinations at [facility]  Diabetic diet discussed in detail,  written exchange diet given  Glucose meter dispensed to patient  Glycohemoglobin and other lab monitoring  Home glucose monitoring emphasized  Labs immediately prior to next visit  Referral to Diabetic Education Department    Followup:   Do labs and RTC 3 months, sooner should new symptoms or problems arise.

## 2019-02-12 ENCOUNTER — TELEPHONE (OUTPATIENT)
Dept: MEDICAL GROUP | Facility: PHYSICIAN GROUP | Age: 64
End: 2019-02-12

## 2019-04-19 ENCOUNTER — HOSPITAL ENCOUNTER (OUTPATIENT)
Dept: LAB | Facility: MEDICAL CENTER | Age: 64
End: 2019-04-19
Attending: FAMILY MEDICINE
Payer: COMMERCIAL

## 2019-04-19 DIAGNOSIS — Z79.4 CONTROLLED TYPE 2 DIABETES MELLITUS WITHOUT COMPLICATION, WITH LONG-TERM CURRENT USE OF INSULIN (HCC): ICD-10-CM

## 2019-04-19 DIAGNOSIS — E11.9 CONTROLLED TYPE 2 DIABETES MELLITUS WITHOUT COMPLICATION, WITH LONG-TERM CURRENT USE OF INSULIN (HCC): ICD-10-CM

## 2019-04-19 LAB
ALBUMIN SERPL BCP-MCNC: 4.2 G/DL (ref 3.2–4.9)
ALBUMIN/GLOB SERPL: 1.3 G/DL
ALP SERPL-CCNC: 76 U/L (ref 30–99)
ALT SERPL-CCNC: 22 U/L (ref 2–50)
ANION GAP SERPL CALC-SCNC: 6 MMOL/L (ref 0–11.9)
AST SERPL-CCNC: 18 U/L (ref 12–45)
BILIRUB SERPL-MCNC: 0.4 MG/DL (ref 0.1–1.5)
BUN SERPL-MCNC: 22 MG/DL (ref 8–22)
CALCIUM SERPL-MCNC: 9.3 MG/DL (ref 8.5–10.5)
CHLORIDE SERPL-SCNC: 107 MMOL/L (ref 96–112)
CHOLEST SERPL-MCNC: 166 MG/DL (ref 100–199)
CO2 SERPL-SCNC: 25 MMOL/L (ref 20–33)
CREAT SERPL-MCNC: 0.83 MG/DL (ref 0.5–1.4)
CREAT UR-MCNC: 486.2 MG/DL
FASTING STATUS PATIENT QL REPORTED: NORMAL
GLOBULIN SER CALC-MCNC: 3.2 G/DL (ref 1.9–3.5)
GLUCOSE SERPL-MCNC: 200 MG/DL (ref 65–99)
HDLC SERPL-MCNC: 33 MG/DL
LDLC SERPL CALC-MCNC: 70 MG/DL
MICROALBUMIN UR-MCNC: 7.5 MG/DL
MICROALBUMIN/CREAT UR: 15 MG/G (ref 0–30)
POTASSIUM SERPL-SCNC: 4.5 MMOL/L (ref 3.6–5.5)
PROT SERPL-MCNC: 7.4 G/DL (ref 6–8.2)
SODIUM SERPL-SCNC: 138 MMOL/L (ref 135–145)
TRIGL SERPL-MCNC: 313 MG/DL (ref 0–149)

## 2019-04-19 PROCEDURE — 82570 ASSAY OF URINE CREATININE: CPT

## 2019-04-19 PROCEDURE — 80053 COMPREHEN METABOLIC PANEL: CPT

## 2019-04-19 PROCEDURE — 36415 COLL VENOUS BLD VENIPUNCTURE: CPT

## 2019-04-19 PROCEDURE — 80061 LIPID PANEL: CPT

## 2019-04-19 PROCEDURE — 82043 UR ALBUMIN QUANTITATIVE: CPT

## 2019-04-19 PROCEDURE — 83036 HEMOGLOBIN GLYCOSYLATED A1C: CPT

## 2019-04-20 LAB
EST. AVERAGE GLUCOSE BLD GHB EST-MCNC: 249 MG/DL
HBA1C MFR BLD: 10.3 % (ref 0–5.6)

## 2019-04-23 ENCOUNTER — TELEPHONE (OUTPATIENT)
Dept: MEDICAL GROUP | Facility: PHYSICIAN GROUP | Age: 64
End: 2019-04-23

## 2019-04-23 NOTE — TELEPHONE ENCOUNTER
----- Message from Shelbie Samuel M.D. sent at 4/23/2019 12:46 PM PDT -----  Please call patient to ensure they received their results through My Chart.  The patient requires follow up office visit.

## 2019-04-23 NOTE — TELEPHONE ENCOUNTER
Phone Number Called: [phone] (home)     Message: Daughter(Tonia) notified on results/ message. States will call back and schedule with the diabetic nurse.     Left Message for patient to call back: N\A

## 2019-05-01 ENCOUNTER — OFFICE VISIT (OUTPATIENT)
Dept: MEDICAL GROUP | Facility: PHYSICIAN GROUP | Age: 64
End: 2019-05-01
Payer: COMMERCIAL

## 2019-05-01 VITALS
DIASTOLIC BLOOD PRESSURE: 74 MMHG | HEIGHT: 67 IN | BODY MASS INDEX: 30.95 KG/M2 | WEIGHT: 197.2 LBS | TEMPERATURE: 98.6 F | SYSTOLIC BLOOD PRESSURE: 126 MMHG | HEART RATE: 70 BPM | OXYGEN SATURATION: 93 % | RESPIRATION RATE: 12 BRPM

## 2019-05-01 DIAGNOSIS — Z79.4 CONTROLLED TYPE 2 DIABETES MELLITUS WITHOUT COMPLICATION, WITH LONG-TERM CURRENT USE OF INSULIN (HCC): ICD-10-CM

## 2019-05-01 DIAGNOSIS — I15.2 HYPERTENSION ASSOCIATED WITH DIABETES (HCC): ICD-10-CM

## 2019-05-01 DIAGNOSIS — E11.9 CONTROLLED TYPE 2 DIABETES MELLITUS WITHOUT COMPLICATION, WITH LONG-TERM CURRENT USE OF INSULIN (HCC): ICD-10-CM

## 2019-05-01 DIAGNOSIS — E78.00 PURE HYPERCHOLESTEROLEMIA: ICD-10-CM

## 2019-05-01 DIAGNOSIS — E11.59 HYPERTENSION ASSOCIATED WITH DIABETES (HCC): ICD-10-CM

## 2019-05-01 PROCEDURE — 99214 OFFICE O/P EST MOD 30 MIN: CPT | Performed by: FAMILY MEDICINE

## 2019-05-01 NOTE — PROGRESS NOTES
Diabetes Focused Exam:    Chief Complaint   Patient presents with   • Results     labs      Subjective:   PEG Vann is a 63 y.o. male who presents for follow up of chronic conditions of diabetes mellitus, hypertension,  and hyperlipidemia. He indicates that he is feeling well and denies any symptoms referable to the above diagnoses. Specifically denies chest pain, palpitations, dyspnea, orthopnea, PND or peripheral edema. Also denies polyuria, polydipsia, urinary complaints, abdominal complaints, myalgias, numbness, weakness or other related symptoms.   The patient is taking ASA every day . Patient denies any side effects of medication.  DM: A1c goal <7  Glucose monitoring frequency: 2-3 times per day   Fasting sugars: >200-300 prior to 1 month ago when increased his Toujeo to 25 units twice daily, this week back down to 120-130s. Post-prandial sugars: not routinely monitoring   Hypoglycemic episodes none  Diabetic complications: none  ACR Albumin/Creatinine Ratio goal <30   HTN: Blood pressure goal <140/<80 . Currently Rx ACE/ARB: Yes  Hyperlipidemia:Cholesterol goal LDL <100, total/HDL <5 . Currently Rx Statin: Yes  Last eye exam   Denies visual blurring, double vision, eye pain and floaters  Last monofilament foot exam:  Denies foot pain, numbness, calluses, ulcers      See medications and orders placed in encounter report.  Past medical history, family history, social history reviewed and updated as documented in medical record.  Current medications including changes today:  Current Outpatient Prescriptions   Medication Sig Dispense Refill   • Insulin Degludec (TRESIBA FLEXTOUCH) 100 UNIT/ML Solution Pen-injector Inject 50 Units as instructed every day for 360 days. 3 PEN 11   • Semaglutide (OZEMPIC) 0.25 or 0.5 MG/DOSE Solution Pen-injector Inject 0.25 mg as instructed every 7 days. 4 PEN 11   • SitaGLIPtin-MetFORMIN HCl (JANUMET XR) 100-1000 MG TABLET SR 24 HR Take 1 Tab by mouth every day. 20  "minutes before dinner 90 Tab 3   • atorvastatin (LIPITOR) 40 MG Tab Take 1 Tab by mouth every day. 90 Tab 3   • Insulin Syringe-Needle U-100 (BD INSULIN SYRINGE ULTRAFINE) 31G X 15/64\" 1 ML Misc 1 Device by Does not apply route 2 Times a Day. 60 Each 11   • lisinopril (PRINIVIL) 20 MG Tab Take 1 Tab by mouth every day. 90 Tab 3   • Insulin Pen Needle (BD PEN NEEDLE JOSEP U/F) 32G X 4 MM Misc 1 Device by Does not apply route 3 times a day. 270 Each 3   • Insulin Pen Needle (BD PEN NEEDLE JOSEP U/F) 32G X 4 MM Misc 1 Device by Does not apply route 4 times a day. 120 Each 11   • Lancets Misc Lancets order: Lancets for Abbott Freestyle Lite meter. Sig: use daily and prn ssx high or low sugar. #100 RF x 3 100 Each 3   • Blood Glucose Monitoring Suppl Supplies Misc Test strips order: Test strips for Abbott Freestyle Lite meter. Sig: daily use and prn ssx high or low sugar #100 RF x 3 100 Each 3   • Glucose Blood (АННА BLOOD GLUCOSE TEST STRIPS VI) by In Vitro route.     • Blood Glucose Monitoring Suppl SUPPLIES Misc Test strips order: Test strips compatible with meter. Sig: use twice daily and prn ssx high or low sugar #200 RF x 3 200 Each 3   • insulin 70/30 (HUMULIN,NOVOLIN) (70-30) 100 UNIT/ML Suspension Inject  as instructed.       No current facility-administered medications for this visit.      Allergies: No Known Allergies   Social History   Substance Use Topics   • Smoking status: Never Smoker   • Smokeless tobacco: Never Used   • Alcohol use No     Exercise: minimal  Health Maintenance/Immunizations: reviewed and updated.     ROS  Pertinent  ROS findings as above. All other systems reviewed and are negative.      Objective:     OBJECTIVE:  /74 (BP Location: Left arm, Patient Position: Sitting, BP Cuff Size: Adult)   Pulse 70   Temp 37 °C (98.6 °F) (Temporal)   Resp 12   Ht 1.702 m (5' 7\")   Wt 89.4 kg (197 lb 3.2 oz)   SpO2 93%   BMI 30.89 kg/m²  Body mass index is 30.89 kg/m². BMI: mildly " obese  General: No apparent distress, conversant, cooperative and pleasant with the examination.  Psych: Alert and oriented x4, judgment and insight normal  Neck: No JVD or bruits, no adenopathy, supple  Thyroid: normal to inspection and palpation  Lungs: negative  Heart: negative  Abdomen: Soft, nontender, no hepatosplenomegaly or masses, normal bowel sounds  Skin: No rashes, no cyanosis, no lesions or ulcers  Extremities: No cyanosis clubbing or edema.   Feet are examined   POC labs today: No results found for: POCGLUCOSE       Last labs    Lab Results   Component Value Date/Time    CHOLSTRLTOT 166 04/19/2019 08:52 AM    LDL 70 04/19/2019 08:52 AM    HDL 33 (A) 04/19/2019 08:52 AM    TRIGLYCERIDE 313 (H) 04/19/2019 08:52 AM       Lab Results   Component Value Date/Time    SODIUM 138 04/19/2019 08:52 AM    POTASSIUM 4.5 04/19/2019 08:52 AM    GLUCOSE 200 (H) 04/19/2019 08:52 AM     Lab Results   Component Value Date/Time    HBA1C 10.3 (H) 04/19/2019 08:52 AM    HBA1C 7.6 (H) 08/25/2018 09:27 AM    HBA1C 7.4 (H) 02/24/2018 08:33 AM     Lab Results   Component Value Date/Time    MALBCRT 15 04/19/2019 08:52 AM    MICROALBUR 7.5 04/19/2019 08:52 AM          Assessment/Plan:   Medications, refills, and referrals per orders.   1. Controlled type 2 diabetes mellitus without complication, with long-term current use of insulin ([facility])  Insulin Degludec (TRESIBA FLEXTOUCH) 100 UNIT/ML Solution Pen-injector   2. Hypertension associated with diabetes (HCC)     3. Pure hypercholesterolemia       DM2 A1c is not at goal   Patient to monitor sugars: 3-4 times daily frequency fasting, QC, AC, QHS  Discussed diet, exercise, disease management and weight loss goals   Education and advise provided today  Will change the Tresiba to 50 units all at once, will try to get ozempic.  Patient will work on carbohydrate reduction, discussed again food groups.  Again discussed walking for exercise.      If he is able to get the Ozempic we will  d/c Janumet, and use plain metformin    Request DM-RN visit in 1 month with Glucose log in hand.      Followup:   Do labs and RTC 1 months, sooner should new symptoms or problems arise.

## 2019-05-24 ENCOUNTER — OFFICE VISIT (OUTPATIENT)
Dept: MEDICAL GROUP | Facility: PHYSICIAN GROUP | Age: 64
End: 2019-05-24
Payer: MEDICARE

## 2019-05-24 VITALS
WEIGHT: 196 LBS | BODY MASS INDEX: 30.76 KG/M2 | TEMPERATURE: 97.9 F | OXYGEN SATURATION: 93 % | HEIGHT: 67 IN | DIASTOLIC BLOOD PRESSURE: 70 MMHG | HEART RATE: 84 BPM | SYSTOLIC BLOOD PRESSURE: 112 MMHG

## 2019-05-24 DIAGNOSIS — E11.29 TYPE 2 DIABETES MELLITUS WITH MICROALBUMINURIA, WITH LONG-TERM CURRENT USE OF INSULIN (HCC): ICD-10-CM

## 2019-05-24 DIAGNOSIS — Z79.4 TYPE 2 DIABETES MELLITUS WITH HYPEROSMOLARITY WITHOUT COMA, WITH LONG-TERM CURRENT USE OF INSULIN (HCC): ICD-10-CM

## 2019-05-24 DIAGNOSIS — Z79.4 TYPE 2 DIABETES MELLITUS WITH MICROALBUMINURIA, WITH LONG-TERM CURRENT USE OF INSULIN (HCC): ICD-10-CM

## 2019-05-24 DIAGNOSIS — E11.00 TYPE 2 DIABETES MELLITUS WITH HYPEROSMOLARITY WITHOUT COMA, WITH LONG-TERM CURRENT USE OF INSULIN (HCC): ICD-10-CM

## 2019-05-24 DIAGNOSIS — R80.9 TYPE 2 DIABETES MELLITUS WITH MICROALBUMINURIA, WITH LONG-TERM CURRENT USE OF INSULIN (HCC): ICD-10-CM

## 2019-05-24 PROCEDURE — 99214 OFFICE O/P EST MOD 30 MIN: CPT | Performed by: FAMILY MEDICINE

## 2019-05-24 NOTE — PROGRESS NOTES
RN-MARIELA Note    Subjective:     Health changes since last visit/interval Hx: General health good.    Medications (including changes made today)  Current Outpatient Prescriptions   Medication Sig Dispense Refill   • Insulin Degludec (TRESIBA FLEXTOUCH) 100 UNIT/ML Solution Pen-injector Inject 50 Units as instructed every day for 360 days. 3 PEN 11   • Semaglutide (OZEMPIC) 0.25 or 0.5 MG/DOSE Solution Pen-injector Inject 0.25 mg as instructed every 7 days. (Patient taking differently: Inject 0.5 mg as instructed every 7 days.) 4 PEN 11   • atorvastatin (LIPITOR) 40 MG Tab Take 1 Tab by mouth every day. 90 Tab 3   • Insulin Pen Needle (BD PEN NEEDLE JOSEP U/F) 32G X 4 MM Misc 1 Device by Does not apply route 3 times a day. 270 Each 3   • Lancets Misc Lancets order: Lancets for Abbott Freestyle Lite meter. Sig: use daily and prn ssx high or low sugar. #100 RF x 3 100 Each 3   • Blood Glucose Monitoring Suppl Supplies Misc Test strips order: Test strips for Abbott Freestyle Lite meter. Sig: daily use and prn ssx high or low sugar #100 RF x 3 100 Each 3   • lisinopril (PRINIVIL) 20 MG Tab Take 1 Tab by mouth every day. (Patient not taking: Reported on 5/24/2019) 90 Tab 3   • Glucose Blood (АННА BLOOD GLUCOSE TEST STRIPS VI) by In Vitro route.     • Blood Glucose Monitoring Suppl SUPPLIES Misc Test strips order: Test strips compatible with meter. Sig: use twice daily and prn ssx high or low sugar #200 RF x 3 200 Each 3     No current facility-administered medications for this visit.        Taking daily ASA: No  Taking above medications as prescribed: yes  SIDE EFFECTS: Patient denies side effects to medications    Exercise: Walking  Diet: Breakfast is coffee, cereal, and fruit.  Lunch beans, rice, tortilla, protein, and vegetables.  Does not eat dinner.  Patient's body mass index is 30.7 kg/m². Exercise and nutrition counseling were performed at this visit.      Health Maintenance:   Health Maintenance Due   Topic Date Due    • RETINAL SCREENING  11/29/2018   • DIABETES MONOFILAMENT / LE EXAM  03/01/2019       DM:   Last A1c:   Lab Results   Component Value Date/Time    HBA1C 10.3 (H) 04/19/2019 08:52 AM      A1C GOAL: < 7    Glucose monitoring frequency: Testing fasting  Breakfast: 119-160  Hypoglycemic episodes: no    Last Retinal Exam: Has an appointment scheduled  Daily Foot Exam: Yes   Routine Dental Exams: Yes    Lab Results   Component Value Date/Time    MALBCRT 15 04/19/2019 08:52 AM    MICROALBUR 7.5 04/19/2019 08:52 AM        ACR Albumin/Creatinine Ratio goal <30     HTN:   Blood pressure goal <140/<80 .   Currently Rx ACE/ARB: No    Dyslipidemia:    Lab Results   Component Value Date/Time    CHOLSTRLTOT 166 04/19/2019 08:52 AM    LDL 70 04/19/2019 08:52 AM    HDL 33 (A) 04/19/2019 08:52 AM    TRIGLYCERIDE 313 (H) 04/19/2019 08:52 AM       Lab Results   Component Value Date/Time    SODIUM 138 04/19/2019 08:52 AM    POTASSIUM 4.5 04/19/2019 08:52 AM    CHLORIDE 107 04/19/2019 08:52 AM    CO2 25 04/19/2019 08:52 AM    GLUCOSE 200 (H) 04/19/2019 08:52 AM    BUN 22 04/19/2019 08:52 AM    CREATININE 0.83 04/19/2019 08:52 AM     Lab Results   Component Value Date/Time    ALKPHOSPHAT 76 04/19/2019 08:52 AM    ASTSGOT 18 04/19/2019 08:52 AM    ALTSGPT 22 04/19/2019 08:52 AM    TBILIRUBIN 0.4 04/19/2019 08:52 AM        Currently Rx Statin: Yes    He  reports that he has never smoked. He has never used smokeless tobacco.    Objective:     Exam:  Monofilament: done   Monofilament testing with a 10 gram force: sensation intact: intact bilaterally  Visual Inspection: Feet without maceration, ulcers, fissures.  Pedal pulses: intact bilaterally    Plan:     Discussed and educated on:   - All medications, side effects and compliance (discussed carefully)  - Annual eye examinations at Ophthalmology  - Home glucose monitoring emphasized  - Weight control and daily exercise    Recommended medication changes: Reviewed how to use his Ozempic.  He  will start with .25 mg weekly for 2 weeks and then increase to .5 mg weekly.  He will continue his Tresiba 50 units daily.  He states the Metformin hurt his stomach.  I have encouraged him to test fasting and one other time of day.

## 2019-05-24 NOTE — PROGRESS NOTES
Subjective:     HPI    Chief Complaint   Patient presents with   • Diabetes       Jose Antonio Vann is a 63 y.o. male who presents for follow up of chronic conditions of diabetes mellitus, hypertension, hyperlipidemia    RN-CDE notes reviewed including HPI for DM, HTN, Hyperlipidemia.  Exercise frequency and limitations reviewed.  Feet symptoms reviewed.  Nutritional status reviewed.  Retinal eye exam history reviewed.    Patient additionally reports:    Needs new blood glucose supplies.      He indicates that he is feeling well and denies any symptoms referable to the above diagnoses. Specifically denies chest pain, palpitations, dyspnea, orthopnea, PND or peripheral edema. Also denies polyuria, polydipsia, urinary complaints, abdominal complaints, myalgias, numbness, weakness or other related symptoms.     Patient Active Problem List    Diagnosis Date Noted   • Sebaceous cyst 01/11/2018   • Obesity (BMI 35.0-39.9 without comorbidity) 01/04/2017   • Type II diabetes mellitus (HCC) 12/06/2016   • Pure hypercholesterolemia 12/06/2016   • Hypertension associated with diabetes (HCC) 12/06/2016       Health Maintenance/Immunizations:   Health Maintenance Topics with due status: Overdue       Topic Date Due    RETINAL SCREENING 11/29/2018    DIABETES MONOFILAMENT / LE EXAM 03/01/2019       Current medications including changes today:  Current Outpatient Prescriptions   Medication Sig Dispense Refill   • Insulin Degludec (TRESIBA FLEXTOUCH) 100 UNIT/ML Solution Pen-injector Inject 50 Units as instructed every day for 360 days. 3 PEN 11   • Semaglutide (OZEMPIC) 0.25 or 0.5 MG/DOSE Solution Pen-injector Inject 0.25 mg as instructed every 7 days. (Patient taking differently: Inject 0.5 mg as instructed every 7 days.) 4 PEN 11   • atorvastatin (LIPITOR) 40 MG Tab Take 1 Tab by mouth every day. 90 Tab 3   • Insulin Pen Needle (BD PEN NEEDLE JOSEP U/F) 32G X 4 MM Misc 1 Device by Does not apply route 3 times a day. 270 Each 3  "  • Lancets Misc Lancets order: Lancets for Abbott Freestyle Lite meter. Sig: use daily and prn ssx high or low sugar. #100 RF x 3 100 Each 3   • Blood Glucose Monitoring Suppl Supplies Misc Test strips order: Test strips for Abbott Freestyle Lite meter. Sig: daily use and prn ssx high or low sugar #100 RF x 3 100 Each 3   • lisinopril (PRINIVIL) 20 MG Tab Take 1 Tab by mouth every day. (Patient not taking: Reported on 5/24/2019) 90 Tab 3   • Glucose Blood (АННА BLOOD GLUCOSE TEST STRIPS VI) by In Vitro route.     • Blood Glucose Monitoring Suppl SUPPLIES Misc Test strips order: Test strips compatible with meter. Sig: use twice daily and prn ssx high or low sugar #200 RF x 3 200 Each 3     No current facility-administered medications for this visit.        Allergies: No Known Allergies     Social History   Substance Use Topics   • Smoking status: Never Smoker   • Smokeless tobacco: Never Used   • Alcohol use No       No family history on file.    ROS  Pertinent ROS findings as above.   All other systems reviewed and are negative except as per HPI.     Objective:     /70   Pulse 84   Temp 36.6 °C (97.9 °F)   Ht 1.702 m (5' 7\")   Wt 88.9 kg (196 lb)   SpO2 93%   BMI 30.70 kg/m²     Alert, oriented in no acute distress.  Eye contact is good, speech goal directed, affect calm.  HEENT: EOMI, PERRL, conjunctiva non-injected, sclera non-icteric.  Nares patent with no significant congestion or drainage.  Meaghan pinnae, external auditory canals, TM pearly gray with normal light reflex bilaterally.  Oral mucous membranes pink and moist with no lesions or thrush.  Neck supple with no cervical lymphadenopathy, JVD, palpable thyroid nodules or carotid bruits.  Lungs: clear to auscultation bilaterally with good excursion.  CV: regular rate and rhythm.  Abdomen soft, non-distended, non-tender with normal bowel sounds. No CVAT  Lower extremities warm with no edema.  Feet are not examined     Lab Results   Component Value " Date/Time    HBA1C 10.3 (H) 04/19/2019 08:52 AM    HBA1C 7.6 (H) 08/25/2018 09:27 AM    HBA1C 7.4 (H) 02/24/2018 08:33 AM        Assessment/Plan:     1. Type 2 diabetes mellitus with microalbuminuria, with long-term current use of insulin (HCC)  Diabetic Monofilament LE Exam       DM2 A1c is not at goal     -RN-CDE notes reviewed and discussed.  -Patient's body mass index is 30.7 kg/m². Exercise and nutrition counseling were performed at this visit.   Additionally discussed disease management and weight loss goals.   -Education and advice provided today: See RN-CDE note.    Additional education, advice, refills, and referrals per order    Follow-up:   2 months with BG log, sooner should new symptoms or problems arise.    The total time spent seeing the patient in consultation, and formulating an action plan for this visit was 40 minutes.  Greater than 50% of this time was spent counseling, discussing problems documented above, coordinating care and answering questions by the provider and registered nurse--certified diabetes educator.

## 2019-05-30 ENCOUNTER — TELEPHONE (OUTPATIENT)
Dept: MEDICAL GROUP | Facility: PHYSICIAN GROUP | Age: 64
End: 2019-05-30

## 2019-05-30 NOTE — TELEPHONE ENCOUNTER
Received a fax from [employer].  This is for the Patient Assistance Program Reorder Request.  The medication Novolin.  I do not see this as a current medication.  Please advise.    Thank you.

## 2019-06-12 ENCOUNTER — PATIENT OUTREACH (OUTPATIENT)
Dept: HEALTH INFORMATION MANAGEMENT | Facility: OTHER | Age: 64
End: 2019-06-12

## 2019-06-12 NOTE — PROGRESS NOTES
Outcome: Left Message    Spoke to daughter Tonia. Per daughter pt Indonesian speaker. Pt's daughter had a lot of questions about the SCP plan and coverage. Per her a form was sent so she can have access to to everything as she takes care of all medical matters for pt. When verifying demographics, she gave a different  and address. I asked her to verify again and she gave correct  and explained that for some reason there was a different  for him and it needed to be fix.   I checked  If we have a pt with the first  given and we do in our records but with the name georgie hughes. That pt has all the demographics Tonia gave.    Both charts have same telephone number    Please transfer to Patient Outreach Team at [phone] when patient returns call.    [employer] Verified: yes    Attempt # 1

## 2019-06-27 NOTE — PROGRESS NOTES
ATTEMPT #2 / LEFT MESSAGE WITH DAUGHTER AND RETURN #. SHE HAS POWER OF ABDULKADIR AND WILL HAVE PAPERS FAXED TO [phone].

## 2019-08-15 ENCOUNTER — TELEPHONE (OUTPATIENT)
Dept: MEDICAL GROUP | Facility: PHYSICIAN GROUP | Age: 64
End: 2019-08-15

## 2019-08-15 NOTE — TELEPHONE ENCOUNTER
ESTABLISHED PATIENT PRE-VISIT PLANNING     Patient was NOT contacted to complete PVP.    1.  Reviewed notes from the last few office visits within the medical group: Yes    2.  If any orders were placed at last visit or intended to be done for this visit (i.e. 6 mos follow-up), do we have Results/Consult Notes?        •  Labs - Labs were not ordered at last office visit.       •  Imaging - Imaging was not ordered at last office visit.       •  Referrals - No referrals were ordered at last office visit.    3. Is this appointment scheduled as a Hospital Follow-Up? No    4.  Immunizations were updated in Epic using WebIZ?: No WebIZ record       •  Web Iz Recommendations: Patient has no WebIZ Records  5.  Patient is due for the following Health Maintenance Topics:   Health Maintenance Due   Topic Date Due   • Annual Wellness Visit  1955   • HEPATITIS C SCREENING  1955   • RETINAL SCREENING  11/29/2018     6. Orders for overdue Health Maintenance topics pended in Pre-Charting? NO    7.  AHA (MDX) form printed for Provider? YES    8.  Patient was NOT informed to arrive 15 min prior to their scheduled appointment and bring in their medication bottles.

## 2019-08-16 ENCOUNTER — OFFICE VISIT (OUTPATIENT)
Dept: MEDICAL GROUP | Facility: PHYSICIAN GROUP | Age: 64
End: 2019-08-16
Payer: MEDICARE

## 2019-08-16 VITALS
WEIGHT: 199 LBS | SYSTOLIC BLOOD PRESSURE: 130 MMHG | HEIGHT: 67 IN | HEART RATE: 84 BPM | DIASTOLIC BLOOD PRESSURE: 70 MMHG | BODY MASS INDEX: 31.23 KG/M2 | OXYGEN SATURATION: 93 %

## 2019-08-16 DIAGNOSIS — E11.59 HYPERTENSION ASSOCIATED WITH DIABETES (HCC): ICD-10-CM

## 2019-08-16 DIAGNOSIS — E11.65 UNCONTROLLED TYPE 2 DIABETES MELLITUS WITH HYPERGLYCEMIA (HCC): ICD-10-CM

## 2019-08-16 DIAGNOSIS — E78.00 PURE HYPERCHOLESTEROLEMIA: ICD-10-CM

## 2019-08-16 DIAGNOSIS — I15.2 HYPERTENSION ASSOCIATED WITH DIABETES (HCC): ICD-10-CM

## 2019-08-16 LAB
HBA1C MFR BLD: 10.1 % (ref 0–5.6)
INT CON NEG: ABNORMAL
INT CON POS: ABNORMAL

## 2019-08-16 PROCEDURE — 99214 OFFICE O/P EST MOD 30 MIN: CPT | Performed by: FAMILY MEDICINE

## 2019-08-16 PROCEDURE — 83036 HEMOGLOBIN GLYCOSYLATED A1C: CPT | Performed by: FAMILY MEDICINE

## 2019-08-16 NOTE — PROGRESS NOTES
Subjective:     HPI    Chief Complaint   Patient presents with   • Diabetes       Jose Antonio Vann is a 63 y.o. male who presents for follow up of chronic conditions of diabetes mellitus, hypertension, hyperlipidemia,     RN-CDE notes reviewed including HPI for DM, HTN, Hyperlipidemia.  Exercise frequency and limitations reviewed.  Feet symptoms reviewed.  Nutritional status reviewed.  Retinal eye exam history reviewed.    Patient additionally reports:    Feeling well, but did not take the ozempic and feels Tresiba was not working, went back to the 70/30.      He indicates that he is feeling well and denies any symptoms referable to the above diagnoses. Specifically denies chest pain, palpitations, dyspnea, orthopnea, PND or peripheral edema. Also denies polyuria, polydipsia, urinary complaints, abdominal complaints, myalgias, numbness, weakness or other related symptoms.     Patient Active Problem List    Diagnosis Date Noted   • Sebaceous cyst 01/11/2018   • Obesity (BMI 35.0-39.9 without comorbidity) 01/04/2017   • Type II diabetes mellitus (HCC) 12/06/2016   • Pure hypercholesterolemia 12/06/2016   • Hypertension associated with diabetes (HCC) 12/06/2016       Health Maintenance/Immunizations: Reviewed.   Health Maintenance Topics with due status: Overdue       Topic Date Due    Annual Wellness Visit 1955    HEPATITIS C SCREENING 1955    RETINAL SCREENING 11/29/2018       Current medications including changes today:  Current Outpatient Medications   Medication Sig Dispense Refill   • Insulin Aspart Prot & Aspart (NOVOLOG MIX 70/30 FLEXPEN) (70-30) 100 UNIT/ML Suspension Pen-injector Inject 30-50 Units as instructed 2 Times a Day for 360 days. 10 PEN 11   • Blood Glucose Test Strips Test strips order: Test strips compatible with meter. Sig: use twice daily and prn ssx high or low sugar #200 RF x 3 200 Each 3   • atorvastatin (LIPITOR) 40 MG Tab Take 1 Tab by mouth every day. 90 Tab 3   • lisinopril  "(PRINIVIL) 20 MG Tab Take 1 Tab by mouth every day. 90 Tab 3     No current facility-administered medications for this visit.        Allergies: No Known Allergies     Social History     Tobacco Use   • Smoking status: Never Smoker   • Smokeless tobacco: Never Used   Substance Use Topics   • Alcohol use: No     Alcohol/week: 0.0 oz   • Drug use: No       No family history on file.    ROS  Pertinent ROS findings as above.   All other systems reviewed and are negative except as per HPI.     Objective:     /70   Pulse 84   Ht 1.702 m (5' 7\")   Wt 90.3 kg (199 lb)   SpO2 93%   BMI 31.17 kg/m²     Alert, oriented in no acute distress.  Eye contact is good, speech goal directed, affect calm.  HEENT: EOMI, PERRL, conjunctiva non-injected, sclera non-icteric.  Nares patent with no significant congestion or drainage.  Meaghan pinnae, external auditory canals, TM pearly gray with normal light reflex bilaterally.  Oral mucous membranes pink and moist with no lesions or thrush.  Neck supple with no cervical lymphadenopathy, JVD, palpable thyroid nodules or carotid bruits.  Lungs: clear to auscultation bilaterally with good excursion.  CV: regular rate and rhythm.  Abdomen soft, non-distended, non-tender with normal bowel sounds. No CVAT  Lower extremities warm with no edema.  Feet are not examined     Lab Results   Component Value Date/Time    HBA1C 10.1 (A) 08/16/2019 08:13 AM    HBA1C 10.3 (H) 04/19/2019 08:52 AM    HBA1C 7.6 (H) 08/25/2018 09:27 AM        Assessment/Plan:     1. Uncontrolled type 2 diabetes mellitus with hyperglycemia (HCC)  We will change back to the NovoLog 70/30 30 units twice daily titrating up for fasting sugars of less than 130 and postprandial sugars less than 180.  Follow-up visit in 1 month with blood glucose log  - Diabetic Monofilament LE Exam  - POCT  A1C    2. Pure hypercholesterolemia  Continue atorvastatin at 40 mg    3. Hypertension associated with diabetes (HCC)  Continue 20 mg of " lisinopril, glycemic control will help with blood pressure control    DM2 A1c is not at goal     -RN-CDE notes reviewed and discussed.  -Patient's body mass index is 31.17 kg/m². Exercise and nutrition counseling were performed at this visit.   Additionally discussed disease management and weight loss goals.   -Education and advice provided today: See RN-CDE note.    Additional education, advice, refills, and referrals per order    Follow-up:   1 month with DMRN and myself,  sooner should new symptoms or problems arise.    The total time spent seeing the patient in consultation, and formulating an action plan for this visit was 40 minutes.  Greater than 50% of this time was spent counseling, discussing problems documented above, coordinating care and answering questions by the provider and registered nurse--certified diabetes educator.

## 2019-08-16 NOTE — PROGRESS NOTES
RN-CDE Note    Subjective:     Health changes since last visit/interval Hx: Feeling good.  Stopped Ozempic and went back to Novolog 70/30 insulin and Tresiba insulin.    Medications (including changes made today)  Current Outpatient Medications   Medication Sig Dispense Refill   • Blood Glucose Test Strips Test strips order: Test strips compatible with meter. Sig: use twice daily and prn ssx high or low sugar #200 RF x 3 200 Each 3   • Insulin Degludec (TRESIBA FLEXTOUCH) 100 UNIT/ML Solution Pen-injector Inject 50 Units as instructed every day for 360 days. 3 PEN 11   • atorvastatin (LIPITOR) 40 MG Tab Take 1 Tab by mouth every day. 90 Tab 3   • lisinopril (PRINIVIL) 20 MG Tab Take 1 Tab by mouth every day. 90 Tab 3   • Semaglutide (OZEMPIC) 0.25 or 0.5 MG/DOSE Solution Pen-injector Inject 0.25 mg as instructed every 7 days. (Patient not taking: Reported on 8/16/2019) 4 PEN 11   • Insulin Pen Needle (BD PEN NEEDLE JOSEP U/F) 32G X 4 MM Misc 1 Device by Does not apply route 3 times a day. 270 Each 3   • Lancets Misc Lancets order: Lancets for Abbott Freestyle Lite meter. Sig: use daily and prn ssx high or low sugar. #100 RF x 3 100 Each 3   • Blood Glucose Monitoring Suppl Supplies Misc Test strips order: Test strips for Abbott Freestyle Lite meter. Sig: daily use and prn ssx high or low sugar #100 RF x 3 100 Each 3   • Glucose Blood (АННА BLOOD GLUCOSE TEST STRIPS VI) by In Vitro route.       No current facility-administered medications for this visit.        Taking daily ASA: No  Taking above medications as prescribed: no states Ozempic not working so stopped  SIDE EFFECTS: Patient denies side effects to medications    Exercise: walking daily  Diet: cereal and coffee in the morning.  Meat, vegetable, starch for dinner.  Patient's body mass index is 31.17 kg/m². Exercise and nutrition counseling were performed at this visit.      Health Maintenance:   Health Maintenance Due   Topic Date Due   • Annual Wellness Visit   1955   • HEPATITIS C SCREENING  1955   • RETINAL SCREENING  11/29/2018           DM:   Last A1c:   Lab Results   Component Value Date/Time    HBA1C 10.1 (A) 08/16/2019 08:13 AM      A1C GOAL: < 7    Glucose monitoring frequency: Testing twice daily  100-200  Hypoglycemic episodes: no    Last Retinal Exam: needs  Daily Foot Exam: Yes   Routine Dental Exams: Yes    Lab Results   Component Value Date/Time    MALBCRT 15 04/19/2019 08:52 AM    MICROALBUR 7.5 04/19/2019 08:52 AM        ACR Albumin/Creatinine Ratio goal <30     HTN:   Blood pressure goal <140/<80 .   Currently Rx ACE/ARB: Yes    Dyslipidemia:    Lab Results   Component Value Date/Time    CHOLSTRLTOT 166 04/19/2019 08:52 AM    LDL 70 04/19/2019 08:52 AM    HDL 33 (A) 04/19/2019 08:52 AM    TRIGLYCERIDE 313 (H) 04/19/2019 08:52 AM       Lab Results   Component Value Date/Time    SODIUM 138 04/19/2019 08:52 AM    POTASSIUM 4.5 04/19/2019 08:52 AM    CHLORIDE 107 04/19/2019 08:52 AM    CO2 25 04/19/2019 08:52 AM    GLUCOSE 200 (H) 04/19/2019 08:52 AM    BUN 22 04/19/2019 08:52 AM    CREATININE 0.83 04/19/2019 08:52 AM     Lab Results   Component Value Date/Time    ALKPHOSPHAT 76 04/19/2019 08:52 AM    ASTSGOT 18 04/19/2019 08:52 AM    ALTSGPT 22 04/19/2019 08:52 AM    TBILIRUBIN 0.4 04/19/2019 08:52 AM        Currently Rx Statin: Yes    He  reports that he has never smoked. He has never used smokeless tobacco.    Objective:     Exam:  Monofilament: done   Monofilament testing with a 10 gram force: sensation intact: intact bilaterally  Visual Inspection: Feet without maceration, ulcers, fissures.  Pedal pulses: intact bilaterally    Plan:     Discussed and educated on:   - All medications, side effects and compliance (discussed carefully)  - Annual eye examinations at Ophthalmology  - Home glucose monitoring emphasized  - Weight control and daily exercise    Recommended medication changes: He would like to switch back to the Novolog 70/30.  He will  start with 30 units BID.

## 2019-10-08 ENCOUNTER — TELEPHONE (OUTPATIENT)
Dept: MEDICAL GROUP | Facility: PHYSICIAN GROUP | Age: 64
End: 2019-10-08

## 2019-10-08 NOTE — TELEPHONE ENCOUNTER
Future Appointments       Provider Department Center    10/18/2019 8:00 AM Shelbie Samuel M.D. [facility]        ESTABLISHED PATIENT PRE-VISIT PLANNING     Patient was contacted to complete PVP.      1.  Reviewed notes from the last few office visits within the medical group: Yes    2.  If any orders were placed at last visit or intended to be done for this visit (i.e. 6 mos follow-up), do we have Results/Consult Notes?        •  Labs - Labs ordered, completed on 08/16/2019 and results are in chart.       •  Imaging - Imaging was not ordered at last office visit.       •  Referrals - No referrals were ordered at last office visit.    3. Is this appointment scheduled as a Hospital Follow-Up? No    4.  Immunizations were updated in Epic using WebIZ?: No WebIZ record     5.  Patient is due for the following Health Maintenance Topics:   Health Maintenance Due   Topic Date Due   • Annual Wellness Visit  1955   • RETINAL SCREENING  11/29/2018   • IMM INFLUENZA (1) 09/01/2019     6. Orders for overdue Health Maintenance topics pended in Pre-Charting? NO    7.  AHA (MDX) form printed for Provider? YES    8.  Patient was informed to arrive 15 min prior to their scheduled appointment and bring in their medication bottles.  10/08/2019- Spoke with pt's daughter.

## 2019-10-08 NOTE — LETTER
Request for Medical Records    Patient Name: Jose Antonio Vann    : 1955      Dear Doctor: Bk Ardon O.D.    The above named patient receives primary care at the [facility] by Shelbie Samuel M.D..  The patient informs us that you are his eye care Provider.    Please fax a copy of the most recent eye exam to [phone] or answer the  questions below and fax this sheet back to us at the above number.  Attached is a signed Release of Information.      Date of last eye exam: _____________    Retinal eye exam summary:        Please select the choice(s) that apply.    ____ No diabetic retinopathy    ____    Diabetic retinopathy present      Printed Name and Credentials: __________________________________    Signature of Eye Care Provider: _________________________________    We appreciate your assistance and collaboration in providing efficient patient care!    Kindest Regards,    [address] [place] [address] [phone]

## 2019-10-08 NOTE — LETTER
[facility]  Shelbie Samuel M.D.  [address]  [place] [address]  Fax: [phone]   Authorization for Release/Disclosure of   Protected Health Information   Name: WILLIS RAE : 1955 SSN: [id number]   Address: [address] [place] [address] [address] [place] [address] Phone:    [phone] (home)    I authorize the entity listed below to release/disclose the PHI below to:   [facility]/Shelbie Samuel M.D. and Shelbie Samuel M.D.   Provider or Entity Name:  Bk Ardon O.D.   Address   City, [facility], [facility]   Phone:      Fax:  [phone]   Reason for request: continuity of care   Information to be released:    [  ] LAST COLONOSCOPY,  including any PATH REPORT and follow-up  [  ] LAST FIT/COLOGUARD RESULT [  ] LAST DEXA  [  ] LAST MAMMOGRAM  [  ] LAST PAP  [  ] LAST LABS [XXX] RETINA EXAM REPORT  [  ] IMMUNIZATION RECORDS  [  ] Release all info      [  ] Check here and initial the line next to each item to release ALL health information INCLUDING  _____ Care and treatment for drug and / or alcohol abuse  _____ HIV testing, infection status, or AIDS  _____ Genetic Testing    DATES OF SERVICE OR TIME PERIOD TO BE DISCLOSED: _____________  I understand and acknowledge that:  * This Authorization may be revoked at any time by you in writing, except if your health information has already been used or disclosed.  * Your health information that will be used or disclosed as a result of you signing this authorization could be re-disclosed by the recipient. If this occurs, your re-disclosed health information may no longer be protected by State or Federal laws.  * You may refuse to sign this Authorization. Your refusal will not affect your ability to obtain treatment.  * This Authorization becomes effective upon signing and will  on (date) __________.      If no date is indicated, this Authorization will  one (1) year from the signature date.    Name: Willis Rae    Signature: CONTINUITY OF CARE   Date:     10/8/2019       PLEASE FAX REQUESTED RECORDS BACK TO: [phone]

## 2019-10-18 ENCOUNTER — HOSPITAL ENCOUNTER (OUTPATIENT)
Dept: LAB | Facility: MEDICAL CENTER | Age: 64
End: 2019-10-18
Attending: FAMILY MEDICINE
Payer: MEDICARE

## 2019-10-18 ENCOUNTER — OFFICE VISIT (OUTPATIENT)
Dept: MEDICAL GROUP | Facility: PHYSICIAN GROUP | Age: 64
End: 2019-10-18
Payer: MEDICARE

## 2019-10-18 VITALS
TEMPERATURE: 97.7 F | WEIGHT: 196.6 LBS | RESPIRATION RATE: 18 BRPM | HEART RATE: 95 BPM | BODY MASS INDEX: 30.79 KG/M2 | SYSTOLIC BLOOD PRESSURE: 128 MMHG | DIASTOLIC BLOOD PRESSURE: 78 MMHG | OXYGEN SATURATION: 92 %

## 2019-10-18 DIAGNOSIS — Z79.4 TYPE 2 DIABETES MELLITUS WITH MICROALBUMINURIA, WITH LONG-TERM CURRENT USE OF INSULIN (HCC): ICD-10-CM

## 2019-10-18 DIAGNOSIS — E78.5 HYPERLIPIDEMIA ASSOCIATED WITH TYPE 2 DIABETES MELLITUS (HCC): ICD-10-CM

## 2019-10-18 DIAGNOSIS — E11.69 HYPERLIPIDEMIA ASSOCIATED WITH TYPE 2 DIABETES MELLITUS (HCC): ICD-10-CM

## 2019-10-18 DIAGNOSIS — I15.2 HYPERTENSION ASSOCIATED WITH DIABETES (HCC): ICD-10-CM

## 2019-10-18 DIAGNOSIS — E11.29 TYPE 2 DIABETES MELLITUS WITH MICROALBUMINURIA, WITH LONG-TERM CURRENT USE OF INSULIN (HCC): ICD-10-CM

## 2019-10-18 DIAGNOSIS — E11.59 HYPERTENSION ASSOCIATED WITH DIABETES (HCC): ICD-10-CM

## 2019-10-18 DIAGNOSIS — Z23 NEED FOR INFLUENZA VACCINATION: ICD-10-CM

## 2019-10-18 DIAGNOSIS — R80.9 TYPE 2 DIABETES MELLITUS WITH MICROALBUMINURIA, WITH LONG-TERM CURRENT USE OF INSULIN (HCC): ICD-10-CM

## 2019-10-18 PROCEDURE — G0008 ADMIN INFLUENZA VIRUS VAC: HCPCS | Performed by: FAMILY MEDICINE

## 2019-10-18 PROCEDURE — 99214 OFFICE O/P EST MOD 30 MIN: CPT | Mod: 25 | Performed by: FAMILY MEDICINE

## 2019-10-18 PROCEDURE — 90662 IIV NO PRSV INCREASED AG IM: CPT | Performed by: FAMILY MEDICINE

## 2019-10-18 NOTE — PROGRESS NOTES
Chief Complaint   Patient presents with   • Diabetes       HISTORY OF PRESENT ILLNESS: Patient is a 63 y.o. male established patient here today for the following concerns:    1. Type 2 diabetes mellitus with microalbuminuria, with long-term current use of insulin (HCC)  2. Hyperlipidemia associated with type 2 diabetes mellitus (HCC)  3. Hypertension associated with diabetes (HCC)    Here for follow up on blood sugar.  Last visit he went back to the 70/30 as he did not feel the Tresiba was lowering the sugars.  He reports maybe this was a mistake as he has been having sugars as high as 300.  The lowest maybe 150 and he has made good efforts to not eat as much carbohydrates.  He did not get the ozempic and has only been using the 70.30.  He would like to try trulicity as he heard that one on TV.  He has complication of microalbuminuria.  He is on ACE and statin therapy.        Past Medical, Social, and Family history reviewed and updated in EPIC    Allergies:Patient has no known allergies.    Current Outpatient Medications   Medication Sig Dispense Refill   • Dulaglutide (TRULICITY) 0.75 MG/0.5ML Solution Pen-injector Inject 0.5 mL as instructed every 7 days for 30 days. Dispense samples please 4 PEN 0   • Insulin Aspart Prot & Aspart (NOVOLOG MIX 70/30 FLEXPEN) (70-30) 100 UNIT/ML Suspension Pen-injector Inject 30-50 Units as instructed 2 Times a Day for 360 days. 10 PEN 11   • Blood Glucose Test Strips Test strips order: Test strips compatible with meter. Sig: use twice daily and prn ssx high or low sugar #200 RF x 3 200 Each 3   • atorvastatin (LIPITOR) 40 MG Tab Take 1 Tab by mouth every day. 90 Tab 3   • lisinopril (PRINIVIL) 20 MG Tab Take 1 Tab by mouth every day. 90 Tab 3     No current facility-administered medications for this visit.          ROS:  Review of Systems   Constitutional: Negative for fever, chills, weight loss and malaise/fatigue.   HENT: Negative for ear pain, nosebleeds, congestion, sore  throat and neck pain.    Eyes: Negative for blurred vision.   Respiratory: Negative for cough, sputum production, shortness of breath and wheezing.    Cardiovascular: Negative for chest pain, palpitations,  and leg swelling.   Gastrointestinal: Negative for heartburn, nausea, vomiting, diarrhea and abdominal pain.   Genitourinary: Negative for dysuria, urgency and frequency.   Musculoskeletal: Negative for myalgias, back pain and joint pain.   Skin: Negative for rash and itching.   Neurological: Negative for dizziness, tingling, tremors, sensory change, focal weakness and headaches.   Endo/Heme/Allergies: Does not bruise/bleed easily.   Psychiatric/Behavioral: Negative for depression, anxiety, suicidal ideas, insomnia and memory loss.      Exam:  /78 (BP Location: Right arm, Patient Position: Sitting, BP Cuff Size: Adult)   Pulse 95   Temp 36.5 °C (97.7 °F) (Temporal)   Resp 18   Wt 89.2 kg (196 lb 9.6 oz)   SpO2 92%     General:  Well nourished, well developed in NAD  Head is grossly normal.  Neck: Supple without JVD   Pulmonary:  Normal effort.   Cardiovascular: Regular rate  Extremities: no clubbing, cyanosis, or edema.  Psych: affect appropriate      Please note that this dictation was created using voice recognition software. I have made every reasonable attempt to correct obvious errors, but I expect that there are errors of grammar and possibly content that I did not discover before finalizing the note.    Assessment/Plan:  1. Type 2 diabetes mellitus with microalbuminuria, with long-term current use of insulin (HCC)  Will get samples of trulicity at 0.75 for 1 month, then see if we can increase the dose next month.  Labs in 2-3 months.  Continue the 70/30.    - HEMOGLOBIN A1C; Future  - Lipid Profile; Future  - MICROALBUMIN CREAT RATIO URINE; Future  - Comp Metabolic Panel; Future  - POCT  A1C    2. Hyperlipidemia associated with type 2 diabetes mellitus (HCC)  Continue atorvastatin   - HEMOGLOBIN  A1C; Future  - Lipid Profile; Future  - MICROALBUMIN CREAT RATIO URINE; Future  - Comp Metabolic Panel; Future    3. Hypertension associated with diabetes (HCC)  Continue lisinopril  - HEMOGLOBIN A1C; Future  - Lipid Profile; Future  - MICROALBUMIN CREAT RATIO URINE; Future  - Comp Metabolic Panel; Future    4. Need for influenza  HD flu given today      1 month follow up

## 2019-10-23 ENCOUNTER — HOSPITAL ENCOUNTER (OUTPATIENT)
Dept: LAB | Facility: MEDICAL CENTER | Age: 64
End: 2019-10-23
Attending: FAMILY MEDICINE
Payer: MEDICARE

## 2019-10-23 DIAGNOSIS — I15.2 HYPERTENSION ASSOCIATED WITH DIABETES (HCC): ICD-10-CM

## 2019-10-23 DIAGNOSIS — R80.9 TYPE 2 DIABETES MELLITUS WITH MICROALBUMINURIA, WITH LONG-TERM CURRENT USE OF INSULIN (HCC): ICD-10-CM

## 2019-10-23 DIAGNOSIS — E11.59 HYPERTENSION ASSOCIATED WITH DIABETES (HCC): ICD-10-CM

## 2019-10-23 DIAGNOSIS — E11.29 TYPE 2 DIABETES MELLITUS WITH MICROALBUMINURIA, WITH LONG-TERM CURRENT USE OF INSULIN (HCC): ICD-10-CM

## 2019-10-23 DIAGNOSIS — E78.5 HYPERLIPIDEMIA ASSOCIATED WITH TYPE 2 DIABETES MELLITUS (HCC): ICD-10-CM

## 2019-10-23 DIAGNOSIS — E11.69 HYPERLIPIDEMIA ASSOCIATED WITH TYPE 2 DIABETES MELLITUS (HCC): ICD-10-CM

## 2019-10-23 DIAGNOSIS — Z79.4 TYPE 2 DIABETES MELLITUS WITH MICROALBUMINURIA, WITH LONG-TERM CURRENT USE OF INSULIN (HCC): ICD-10-CM

## 2019-10-23 LAB
ALBUMIN SERPL BCP-MCNC: 4.1 G/DL (ref 3.2–4.9)
ALBUMIN/GLOB SERPL: 1.2 G/DL
ALP SERPL-CCNC: 84 U/L (ref 30–99)
ALT SERPL-CCNC: 17 U/L (ref 2–50)
ANION GAP SERPL CALC-SCNC: 10 MMOL/L (ref 0–11.9)
AST SERPL-CCNC: 15 U/L (ref 12–45)
BILIRUB SERPL-MCNC: 0.7 MG/DL (ref 0.1–1.5)
BUN SERPL-MCNC: 14 MG/DL (ref 8–22)
CALCIUM SERPL-MCNC: 9.3 MG/DL (ref 8.5–10.5)
CHLORIDE SERPL-SCNC: 101 MMOL/L (ref 96–112)
CHOLEST SERPL-MCNC: 194 MG/DL (ref 100–199)
CO2 SERPL-SCNC: 25 MMOL/L (ref 20–33)
CREAT SERPL-MCNC: 0.89 MG/DL (ref 0.5–1.4)
CREAT UR-MCNC: 78.3 MG/DL
EST. AVERAGE GLUCOSE BLD GHB EST-MCNC: 275 MG/DL
FASTING STATUS PATIENT QL REPORTED: NORMAL
GLOBULIN SER CALC-MCNC: 3.3 G/DL (ref 1.9–3.5)
GLUCOSE SERPL-MCNC: 372 MG/DL (ref 65–99)
HBA1C MFR BLD: 11.2 % (ref 0–5.6)
HDLC SERPL-MCNC: 35 MG/DL
LDLC SERPL CALC-MCNC: 112 MG/DL
MICROALBUMIN UR-MCNC: 11 MG/DL
MICROALBUMIN/CREAT UR: 140 MG/G (ref 0–30)
POTASSIUM SERPL-SCNC: 4 MMOL/L (ref 3.6–5.5)
PROT SERPL-MCNC: 7.4 G/DL (ref 6–8.2)
SODIUM SERPL-SCNC: 136 MMOL/L (ref 135–145)
TRIGL SERPL-MCNC: 234 MG/DL (ref 0–149)

## 2019-10-23 PROCEDURE — 80061 LIPID PANEL: CPT

## 2019-10-23 PROCEDURE — 36415 COLL VENOUS BLD VENIPUNCTURE: CPT

## 2019-10-23 PROCEDURE — 82570 ASSAY OF URINE CREATININE: CPT

## 2019-10-23 PROCEDURE — 80053 COMPREHEN METABOLIC PANEL: CPT

## 2019-10-23 PROCEDURE — 82043 UR ALBUMIN QUANTITATIVE: CPT

## 2019-10-23 PROCEDURE — 83036 HEMOGLOBIN GLYCOSYLATED A1C: CPT

## 2019-10-29 DIAGNOSIS — E11.65 UNCONTROLLED TYPE 2 DIABETES MELLITUS WITH HYPERGLYCEMIA (HCC): ICD-10-CM

## 2019-10-29 DIAGNOSIS — E11.29 TYPE 2 DIABETES MELLITUS WITH MICROALBUMINURIA, WITH LONG-TERM CURRENT USE OF INSULIN (HCC): ICD-10-CM

## 2019-10-29 DIAGNOSIS — R80.9 TYPE 2 DIABETES MELLITUS WITH MICROALBUMINURIA, WITH LONG-TERM CURRENT USE OF INSULIN (HCC): ICD-10-CM

## 2019-10-29 DIAGNOSIS — Z79.4 TYPE 2 DIABETES MELLITUS WITH MICROALBUMINURIA, WITH LONG-TERM CURRENT USE OF INSULIN (HCC): ICD-10-CM

## 2019-11-01 ENCOUNTER — OFFICE VISIT (OUTPATIENT)
Dept: ENDOCRINOLOGY | Facility: MEDICAL CENTER | Age: 64
End: 2019-11-01
Payer: MEDICARE

## 2019-11-01 VITALS
BODY MASS INDEX: 31.23 KG/M2 | DIASTOLIC BLOOD PRESSURE: 70 MMHG | HEART RATE: 76 BPM | OXYGEN SATURATION: 92 % | SYSTOLIC BLOOD PRESSURE: 120 MMHG | HEIGHT: 67 IN | WEIGHT: 199 LBS

## 2019-11-01 DIAGNOSIS — E11.29 TYPE 2 DIABETES MELLITUS WITH MICROALBUMINURIA, WITH LONG-TERM CURRENT USE OF INSULIN (HCC): ICD-10-CM

## 2019-11-01 DIAGNOSIS — Z79.4 TYPE 2 DIABETES MELLITUS WITH MICROALBUMINURIA, WITH LONG-TERM CURRENT USE OF INSULIN (HCC): ICD-10-CM

## 2019-11-01 DIAGNOSIS — E11.59 HYPERTENSION ASSOCIATED WITH DIABETES (HCC): ICD-10-CM

## 2019-11-01 DIAGNOSIS — I15.2 HYPERTENSION ASSOCIATED WITH DIABETES (HCC): ICD-10-CM

## 2019-11-01 DIAGNOSIS — R80.9 TYPE 2 DIABETES MELLITUS WITH MICROALBUMINURIA, WITH LONG-TERM CURRENT USE OF INSULIN (HCC): ICD-10-CM

## 2019-11-01 DIAGNOSIS — E78.00 PURE HYPERCHOLESTEROLEMIA: ICD-10-CM

## 2019-11-01 DIAGNOSIS — Z79.4 LONG-TERM INSULIN USE (HCC): ICD-10-CM

## 2019-11-01 PROCEDURE — 99214 OFFICE O/P EST MOD 30 MIN: CPT | Performed by: INTERNAL MEDICINE

## 2019-11-01 NOTE — PROGRESS NOTES
"Chief Complaint:  Consult requested by Shelbie Samuel M.D. for initial evaluation of Type 2 Diabetes Mellitus    HPI:   Jose Antonio Vann is a 63 y.o. male with Type 2 Diabetes Mellitus diagnosed over 10 years ago.  He denies hospitalizations for DKA in the past.    His a1c is elevated at 11.2% on 10/23/2019.  He is taking Novolog 70/30 bid but is not injecting it properly and is taking it after meals.   He is also on Ozempic 0.25mg weekly through the [employer] patient assistance program.  He reports persistent hyperglycemia with blood sugars of greater than 150-200 despite taking his premixed insulin.  He is not aware that premixed insulin should be taken before the meal and not after the fact.   There is some questionable element of noncompliance in his situation however.    He cannot afford other insulins such as Tresiba and NovoLog and can only afford premixed insulin.     He monitors blood glucose  1 times per day. Blood glucose values range:Breakfast: 150, 200, 140     He did not bring his meter or logbook.        He denies hypoglycemic episodes.  He  denies hypoglycemic unawareness. He denies episodes of severe hypoglycemia requiring third party assistance.  He  is not wearing a medical alert bracelet or necklace.  He does not a glucagon emergency kit.    He denies attending diabetes education classes.  Diet: \"unhealthy\" diet in general.  He is eating a lot of tortillas, and drinking a lot of juice.    Diabetes Complications   He  denies history of retinopathy.  He denies laser eye surgery. Last eye exam: September 2019 with Dr. Lane.  He denies history of peripheral sensory neuropathy.  He denies numbness, tingling in both feet.  He denies history of foot sores.   He denies history of kidney damage.  He is on ACE inhibitor or ARB.   He denies history of coronary artery disease.  He  denies history of stroke and denies TIA.  He denies history of PAD.  He reports history of hyperlipidemia. He is on " Atorvastatin. He is tolerating this medication very well and denies muscle aches and cramps.      ROS:     CONS:     No fever, no chills, no weight loss, no fatigue   EYES:      No diplopia, reports blurry vision, no redness of eyes, no swelling of eyelids   ENT:    No hearing loss, No ear pain, No sore throat, no dysphagia, no neck swelling   CV:     No chest pain, no palpitations, no claudication, no orthopnea, no PND   PULM:    No SOB, no cough, no hemoptysis, no wheezing    GI:    Reports nausea, no vomiting, no diarrhea, no constipation, no bloody stools   :  Passing urine well, no dysuria, no hematuria   ENDO:    Reports polyuria, no polydipsia, no heat intolerance, no cold intolerance   NEURO: No headaches, no dizziness, no convulsions, no tremors   MUSC:  No joint swellings, no arthralgias, no myalgias, no weakness   SKIN:   No rash, no ulcers, no dry skin   PSYCH:   No depression, no anxiety, no difficulty sleeping       Past Medical History:  Patient Active Problem List    Diagnosis Date Noted   • Sebaceous cyst 01/11/2018   • Obesity (BMI 35.0-39.9 without comorbidity) 01/04/2017   • Type II diabetes mellitus (HCC) 12/06/2016   • Pure hypercholesterolemia 12/06/2016   • Hypertension associated with diabetes (HCC) 12/06/2016       Past Surgical History:  No past surgical history on file.     Allergies:  Patient has no known allergies.     Current Medications:    Current Outpatient Medications:   •  Blood Glucose Test Strips, Test strips order: Test strips compatible with meter. Sig: use twice daily and prn ssx high or low sugar #200 RF x 3, Disp: 200 Each, Rfl: 3  •  atorvastatin (LIPITOR) 40 MG Tab, Take 1 Tab by mouth every day., Disp: 90 Tab, Rfl: 3  •  lisinopril (PRINIVIL) 20 MG Tab, Take 1 Tab by mouth every day., Disp: 90 Tab, Rfl: 3  •  Dulaglutide (TRULICITY) 0.75 MG/0.5ML Solution Pen-injector, Inject 0.5 mL as instructed every 7 days for 30 days. Dispense samples please (Patient not taking:  "Reported on 11/1/2019), Disp: 4 PEN, Rfl: 0  •  Insulin Aspart Prot & Aspart (NOVOLOG MIX 70/30 FLEXPEN) (70-30) 100 UNIT/ML Suspension Pen-injector, Inject 30-50 Units as instructed 2 Times a Day for 360 days. (Patient taking differently: Inject 50-60 Units as instructed 2 Times a Day.), Disp: 10 PEN, Rfl: 11    Social History:  Social History     Socioeconomic History   • Marital status:      Spouse name: Not on file   • Number of children: Not on file   • Years of education: Not on file   • Highest education level: Not on file   Occupational History   • Not on file   Social Needs   • Financial resource strain: Not on file   • Food insecurity:     Worry: Not on file     Inability: Not on file   • Transportation needs:     Medical: Not on file     Non-medical: Not on file   Tobacco Use   • Smoking status: Never Smoker   • Smokeless tobacco: Never Used   Substance and Sexual Activity   • Alcohol use: No     Alcohol/week: 0.0 oz   • Drug use: No   • Sexual activity: Yes     Partners: Female   Lifestyle   • Physical activity:     Days per week: Not on file     Minutes per session: Not on file   • Stress: Not on file   Relationships   • Social connections:     Talks on phone: Not on file     Gets together: Not on file     Attends Denominational service: Not on file     Active member of club or organization: Not on file     Attends meetings of clubs or organizations: Not on file     Relationship status: Not on file   • Intimate partner violence:     Fear of current or ex partner: Not on file     Emotionally abused: Not on file     Physically abused: Not on file     Forced sexual activity: Not on file   Other Topics Concern   • Not on file   Social History Narrative   • Not on file        Family History:   No family history on file.    PHYSICAL EXAM:   Vital signs: /70   Pulse 76   Ht 1.702 m (5' 7\")   Wt 90.3 kg (199 lb)   SpO2 92%   BMI 31.17 kg/m²   GENERAL: Obese  gentleman in no apparent " distress.   EYE: No ocular and eyelid asymmetry, Anicteric sclerae,  PERRL, No exophthalmos or lidlag  HENT: Hearing grossly intact, Normocephalic, atraumatic. Pink, moist mucous membranes, No exudate  NECK: Supple. Trachea midline. thyroid is normal in size without nodules or tenderness  CARDIOVASCULAR: Regular rate and rhythm. No murmurs, rubs, or gallops.   LUNGS: Clear to auscultation bilaterally   ABDOMEN: Soft, nontender with positive bowel sounds.   EXTREMITIES: No clubbing, cyanosis, or edema.   NEUROLOGICAL: Cranial nerves II-XII are grossly intact   Symmetric reflexes at the patella no proximal muscle weakness, No visible tremor with both outstretched hands  LYMPH: No cervical, supraclavicular,  adenopathy palpated.   SKIN: No rashes, lesions. Turgor is normal.  FOOT: Normal sensation to monofilament testing, normal pulses, no ulcers.  Normal Vibration quantitative sensation test.    Labs:  Lab Results   Component Value Date/Time    HBA1C 11.2 (H) 10/23/2019 0835    AVGLUC 275 10/23/2019 0835       No results found for: WBC, RBC, HEMOGLOBIN, MCV, MCH, MCHC, RDW, MPV    Lab Results   Component Value Date/Time    SODIUM 136 10/23/2019 08:35 AM    POTASSIUM 4.0 10/23/2019 08:35 AM    CHLORIDE 101 10/23/2019 08:35 AM    CO2 25 10/23/2019 08:35 AM    ANION 10.0 10/23/2019 08:35 AM    GLUCOSE 372 (H) 10/23/2019 08:35 AM    BUN 14 10/23/2019 08:35 AM    CREATININE 0.89 10/23/2019 08:35 AM    CALCIUM 9.3 10/23/2019 08:35 AM    ASTSGOT 15 10/23/2019 08:35 AM    ALTSGPT 17 10/23/2019 08:35 AM    TBILIRUBIN 0.7 10/23/2019 08:35 AM    ALBUMIN 4.1 10/23/2019 08:35 AM    TOTPROTEIN 7.4 10/23/2019 08:35 AM    GLOBULIN 3.3 10/23/2019 08:35 AM    AGRATIO 1.2 10/23/2019 08:35 AM       Lab Results   Component Value Date/Time    CHOLSTRLTOT 194 10/23/2019 0835    TRIGLYCERIDE 234 (H) 10/23/2019 0835    HDL 35 (A) 10/23/2019 0835     (H) 10/23/2019 0835       Lab Results   Component Value Date/Time    MALBCRT 140 (H)  10/23/2019 08:32 AM    MICROALBUR 11.0 10/23/2019 08:32 AM        No results found for: TSHULTRASEN  No results found for: FREEDIR  No results found for: FREET3  No results found for: THYSTIMIG      ASSESSMENT/PLAN:     1. Type 2 diabetes mellitus with microalbuminuria, with long-term current use of insulin (HCC)  Uncontrolled secondary to hyperglycemia.  We discovered today that the patient is not utilizing the premix insulin properly.  He is injecting it after the meal and not before the meal.  This could be a factor for his persistent hyperglycemia.  He also did not follow the instructions properly for Ozempic administration.    I want him to continue NovoLog 70/30 50 units before breakfast and 50 units before supper.  He is reminded to inject the premix insulin properly.  He should increase his Ozempic to 0.5 mg weekly.  I gave him some samples.  He is instructed to check his blood sugars before breakfast and before supper.    We discussed that he should follow a low-carb diet and exercise regularly.  We will plan for follow-up in 2 to 3 weeks to review his blood sugars    2. Pure hypercholesterolemia  Uncontrolled, continue current statin and follow low-fat diet recommend repeating fasting lipids after 6 to 12 months.  I expect his triglycerides to improve with better glycemic control    3. Hypertension associated with diabetes (HCC)  Fairly controlled continue current medications.  Recommend that he follow a low-salt diet.    4. Long-term insulin use (HCC)  Patient is on long-term premix insulin therapy for uncontrolled type 2 diabetes complicated by social factors      Return in about 3 weeks (around 11/22/2019).       This patient during there office visit was started on new medication.  Side effects of new medications were discussed with the patient today in the office. The patient was supplied paperwork on this new medication.    Thank you kindly for allowing me to participate in the diabetes care plan for  this patient.    Steve Castellon MD, FACE, [facility]  11/01/19    CC:   Shelbie Samuel M.D.

## 2019-11-01 NOTE — PATIENT INSTRUCTIONS
Please inject the novolog 70/30 50u before breakfast and 50u before supper     Please check BG before breakfast and before supper    Take Ozempci 0.5mg weekly

## 2019-11-06 ENCOUNTER — HOSPITAL ENCOUNTER (OUTPATIENT)
Dept: LAB | Facility: MEDICAL CENTER | Age: 64
End: 2019-11-06
Attending: INTERNAL MEDICINE
Payer: MEDICARE

## 2019-11-06 DIAGNOSIS — Z79.4 TYPE 2 DIABETES MELLITUS WITH MICROALBUMINURIA, WITH LONG-TERM CURRENT USE OF INSULIN (HCC): ICD-10-CM

## 2019-11-06 DIAGNOSIS — E11.29 TYPE 2 DIABETES MELLITUS WITH MICROALBUMINURIA, WITH LONG-TERM CURRENT USE OF INSULIN (HCC): ICD-10-CM

## 2019-11-06 DIAGNOSIS — R80.9 TYPE 2 DIABETES MELLITUS WITH MICROALBUMINURIA, WITH LONG-TERM CURRENT USE OF INSULIN (HCC): ICD-10-CM

## 2019-11-06 LAB
T4 FREE SERPL-MCNC: 0.79 NG/DL (ref 0.53–1.43)
TSH SERPL DL<=0.005 MIU/L-ACNC: 1.93 UIU/ML (ref 0.38–5.33)

## 2019-11-06 PROCEDURE — 84443 ASSAY THYROID STIM HORMONE: CPT

## 2019-11-06 PROCEDURE — 84439 ASSAY OF FREE THYROXINE: CPT

## 2019-11-06 PROCEDURE — 36415 COLL VENOUS BLD VENIPUNCTURE: CPT

## 2019-11-06 PROCEDURE — 86341 ISLET CELL ANTIBODY: CPT

## 2019-11-06 PROCEDURE — 84681 ASSAY OF C-PEPTIDE: CPT

## 2019-11-06 PROCEDURE — 80053 COMPREHEN METABOLIC PANEL: CPT

## 2019-11-07 LAB
ALBUMIN SERPL BCP-MCNC: 4.1 G/DL (ref 3.2–4.9)
ALBUMIN/GLOB SERPL: 1.4 G/DL
ALP SERPL-CCNC: 80 U/L (ref 30–99)
ALT SERPL-CCNC: 18 U/L (ref 2–50)
ANION GAP SERPL CALC-SCNC: 6 MMOL/L (ref 0–11.9)
AST SERPL-CCNC: 17 U/L (ref 12–45)
BILIRUB SERPL-MCNC: 0.5 MG/DL (ref 0.1–1.5)
BUN SERPL-MCNC: 15 MG/DL (ref 8–22)
CALCIUM SERPL-MCNC: 8.9 MG/DL (ref 8.5–10.5)
CHLORIDE SERPL-SCNC: 107 MMOL/L (ref 96–112)
CO2 SERPL-SCNC: 28 MMOL/L (ref 20–33)
CREAT SERPL-MCNC: 0.95 MG/DL (ref 0.5–1.4)
GLOBULIN SER CALC-MCNC: 2.9 G/DL (ref 1.9–3.5)
GLUCOSE SERPL-MCNC: 68 MG/DL (ref 65–99)
POTASSIUM SERPL-SCNC: 4 MMOL/L (ref 3.6–5.5)
PROT SERPL-MCNC: 7 G/DL (ref 6–8.2)
SODIUM SERPL-SCNC: 141 MMOL/L (ref 135–145)

## 2019-11-08 LAB — C PEPTIDE SERPL-MCNC: 0.7 NG/ML (ref 0.8–3.5)

## 2019-11-09 LAB — GAD65 AB SER IA-ACNC: <5 IU/ML (ref 0–5)

## 2019-11-12 NOTE — PROGRESS NOTES
Daughter of the member called and she had a question about setting up online access to [facility]. As I was speaking with her she provided me with the correct date of birth of the patient 1955 with medical number record [id number] in [facility]. On [employer] we have the member listen under a different date of birth 01/06/1956 [id number]. She stated she has been trying to get this resolved for her father since August of 2019 and has not heard anything back. She also wanted to know what tier the mediations the patient is being prescribed fall in category medications provided were Lipitor Tier 6, Novolog Tier 3, Lisinopril Tier 6, glucose test strips, Trulicity Tier 3, Ozempic Tier 3. She also asked if the member had dental coverage on his plan and let her know that the plan the member isc urrently on does not hace dental coverage and informed her the enhanced 45.00 a month and select plan 180.00 a month. I let her know we would call her back.

## 2019-11-13 DIAGNOSIS — E78.5 HYPERLIPIDEMIA ASSOCIATED WITH TYPE 2 DIABETES MELLITUS (HCC): ICD-10-CM

## 2019-11-13 DIAGNOSIS — E11.69 HYPERLIPIDEMIA ASSOCIATED WITH TYPE 2 DIABETES MELLITUS (HCC): ICD-10-CM

## 2019-11-14 RX ORDER — ATORVASTATIN CALCIUM 40 MG/1
TABLET, FILM COATED ORAL
Qty: 100 TAB | Refills: 1 | Status: SHIPPED | OUTPATIENT
Start: 2019-11-14 | End: 2020-08-27

## 2019-11-14 NOTE — TELEPHONE ENCOUNTER
Pt has had OV within the 12 month protocol and lipid panel is current. 6 month supply sent to pharmacy.   Lab Results   Component Value Date/Time    CHOLSTRLTOT 194 10/23/2019 08:35 AM     (H) 10/23/2019 08:35 AM    HDL 35 (A) 10/23/2019 08:35 AM    TRIGLYCERIDE 234 (H) 10/23/2019 08:35 AM       Lab Results   Component Value Date/Time    SODIUM 141 11/06/2019 01:43 PM    POTASSIUM 4.0 11/06/2019 01:43 PM    CHLORIDE 107 11/06/2019 01:43 PM    CO2 28 11/06/2019 01:43 PM    GLUCOSE 68 11/06/2019 01:43 PM    BUN 15 11/06/2019 01:43 PM    CREATININE 0.95 11/06/2019 01:43 PM     Lab Results   Component Value Date/Time    ALKPHOSPHAT 80 11/06/2019 01:43 PM    ASTSGOT 17 11/06/2019 01:43 PM    ALTSGPT 18 11/06/2019 01:43 PM    TBILIRUBIN 0.5 11/06/2019 01:43 PM

## 2019-11-14 NOTE — TELEPHONE ENCOUNTER
Was the patient seen in the last year in this department? Yes    Does patient have an active prescription for medications requested? No     Received Request Via: Pharmacy      Pt met protocol?: Yes   Pt last ov 10/19   Lab Results   Component Value Date/Time    CHOLSTRLTOT 194 10/23/2019 0835    TRIGLYCERIDE 234 (H) 10/23/2019 0835    HDL 35 (A) 10/23/2019 0835     (H) 10/23/2019 0835

## 2019-11-22 ENCOUNTER — APPOINTMENT (OUTPATIENT)
Dept: ENDOCRINOLOGY | Facility: MEDICAL CENTER | Age: 64
End: 2019-11-22
Payer: MEDICARE

## 2019-12-06 ENCOUNTER — OFFICE VISIT (OUTPATIENT)
Dept: MEDICAL GROUP | Facility: PHYSICIAN GROUP | Age: 64
End: 2019-12-06
Payer: MEDICARE

## 2019-12-06 ENCOUNTER — OFFICE VISIT (OUTPATIENT)
Dept: ENDOCRINOLOGY | Facility: MEDICAL CENTER | Age: 64
End: 2019-12-06
Payer: MEDICARE

## 2019-12-06 VITALS
HEART RATE: 97 BPM | HEIGHT: 67 IN | WEIGHT: 202 LBS | OXYGEN SATURATION: 91 % | BODY MASS INDEX: 31.71 KG/M2 | DIASTOLIC BLOOD PRESSURE: 70 MMHG | SYSTOLIC BLOOD PRESSURE: 106 MMHG

## 2019-12-06 VITALS
SYSTOLIC BLOOD PRESSURE: 124 MMHG | WEIGHT: 204 LBS | TEMPERATURE: 97.8 F | OXYGEN SATURATION: 92 % | HEART RATE: 94 BPM | RESPIRATION RATE: 16 BRPM | BODY MASS INDEX: 32.02 KG/M2 | HEIGHT: 67 IN | DIASTOLIC BLOOD PRESSURE: 82 MMHG

## 2019-12-06 DIAGNOSIS — Z79.4 LONG-TERM INSULIN USE (HCC): ICD-10-CM

## 2019-12-06 DIAGNOSIS — R80.9 TYPE 2 DIABETES MELLITUS WITH MICROALBUMINURIA, WITH LONG-TERM CURRENT USE OF INSULIN (HCC): ICD-10-CM

## 2019-12-06 DIAGNOSIS — I15.2 HYPERTENSION ASSOCIATED WITH DIABETES (HCC): ICD-10-CM

## 2019-12-06 DIAGNOSIS — Z79.4 TYPE 2 DIABETES MELLITUS WITH MICROALBUMINURIA, WITH LONG-TERM CURRENT USE OF INSULIN (HCC): ICD-10-CM

## 2019-12-06 DIAGNOSIS — E78.00 PURE HYPERCHOLESTEROLEMIA: ICD-10-CM

## 2019-12-06 DIAGNOSIS — E11.29 TYPE 2 DIABETES MELLITUS WITH MICROALBUMINURIA, WITH LONG-TERM CURRENT USE OF INSULIN (HCC): ICD-10-CM

## 2019-12-06 DIAGNOSIS — E11.59 HYPERTENSION ASSOCIATED WITH DIABETES (HCC): ICD-10-CM

## 2019-12-06 PROCEDURE — 99214 OFFICE O/P EST MOD 30 MIN: CPT | Performed by: FAMILY MEDICINE

## 2019-12-06 PROCEDURE — 99214 OFFICE O/P EST MOD 30 MIN: CPT | Performed by: INTERNAL MEDICINE

## 2019-12-06 NOTE — PROGRESS NOTES
CHIEF COMPLAINT: Patient is here for follow up of Type 2 Diabetes Mellitus    HPI:     Jose Antonio Vann is a 63 y.o. male with Type 2 Diabetes Mellitus here for follow up.    Labs from 10/23/2019 show HbA1c is elevated at 11.2%    I initially saw him as a consult from Dr. Shelbie Samuel for uncontrolled diabetes at baseline while taking premix NovoLog 70/30 and Ozempic.  We discovered that he was not taking the premixed insulin properly and he was not titrating the Ozempic to the correct dose.  He was given education after his initial consultation visit and was advised to inject the premix insulin before meals and not after the fact.    Of note he is unable to afford basal bolus therapy and he is getting Ozempic through a patient assistance program which was initiated by his primary care physician.    On follow-up he is now taking NovoLog 70/30 50 units before breakfast and 50 units before supper.  He is also on Ozempic 0.5 mg weekly.    His blood sugars look better and he is feeling better.  He reports less hyperglycemia and denies hypoglycemia.    He has hyperlipidemia and is taking atorvastatin and he denies muscle aches and cramps.    He also has essential hypertension and is taking lisinopril and is tolerating this medication pretty well.      BG Diary:12/6/2019  Breakfast: 170, 150, 160 and Dinner: 130, 150, 160    Weight has been stable    Diabetes Complications   Retinopathy: No known retinopathy.  Last eye exam: September 2019  Neuropathy: Denies paresthesias or numbness in hands or feet. Denies any foot wounds.  Exercise: Minimal.  Diet: Fair.  Patient's medications, allergies, and social histories were reviewed and updated as appropriate.    ROS:     CONS:     No fever, no chills   EYES:     No diplopia, no blurry vision   CV:           No chest pain, no palpitations   PULM:     No SOB, no cough, no hemoptysis.   GI:            No nausea, no vomiting, no diarrhea, no constipation   ENDO:     No polyuria, no  "polydipsia, no heat intolerance, no cold intolerance       Past Medical History:  Problem List:  2019-11: Long-term insulin use (HCC)  2018-01: Sebaceous cyst  2017-01: Obesity (BMI 35.0-39.9 without comorbidity)  2016-12: Type II diabetes mellitus (HCC)  2016-12: Pure hypercholesterolemia  2016-12: Hypertension associated with diabetes (HCC)      Past Surgical History:  History reviewed. No pertinent surgical history.     Allergies:  Patient has no known allergies.     Social History:  Social History     Tobacco Use   • Smoking status: Never Smoker   • Smokeless tobacco: Never Used   Substance Use Topics   • Alcohol use: No     Alcohol/week: 0.0 oz   • Drug use: No        Family History:   family history is not on file.      PHYSICAL EXAM:   OBJECTIVE:  Vital signs: /70   Pulse 97   Ht 1.702 m (5' 7\")   Wt 91.6 kg (202 lb)   SpO2 91%   BMI 31.64 kg/m²   GENERAL: Well-developed, well-nourished in no apparent distress.   EYE:  No ocular asymmetry, PERRLA  HENT: Pink, moist mucous membranes.    NECK: No thyromegaly.   CARDIOVASCULAR:  No murmurs  LUNGS: Clear breath sounds  ABDOMEN: Soft, nontender   EXTREMITIES: No clubbing, cyanosis, or edema.   NEUROLOGICAL: No gross focal motor abnormalities   LYMPH: No cervical adenopathy palpated.   SKIN: No rashes, lesions.     Labs:  Lab Results   Component Value Date/Time    HBA1C 11.2 (H) 10/23/2019 08:35 AM        No results found for: WBC, RBC, HEMOGLOBIN, MCV, MCH, MCHC, RDW, MPV    Lab Results   Component Value Date/Time    SODIUM 141 11/06/2019 01:43 PM    POTASSIUM 4.0 11/06/2019 01:43 PM    CHLORIDE 107 11/06/2019 01:43 PM    CO2 28 11/06/2019 01:43 PM    ANION 6.0 11/06/2019 01:43 PM    GLUCOSE 68 11/06/2019 01:43 PM    BUN 15 11/06/2019 01:43 PM    CREATININE 0.95 11/06/2019 01:43 PM    CALCIUM 8.9 11/06/2019 01:43 PM    ASTSGOT 17 11/06/2019 01:43 PM    ALTSGPT 18 11/06/2019 01:43 PM    TBILIRUBIN 0.5 11/06/2019 01:43 PM    ALBUMIN 4.1 11/06/2019 01:43 " PM    TOTPROTEIN 7.0 11/06/2019 01:43 PM    GLOBULIN 2.9 11/06/2019 01:43 PM    AGRATIO 1.4 11/06/2019 01:43 PM       Lab Results   Component Value Date/Time    CHOLSTRLTOT 194 10/23/2019 0835    TRIGLYCERIDE 234 (H) 10/23/2019 0835    HDL 35 (A) 10/23/2019 0835     (H) 10/23/2019 0835       Lab Results   Component Value Date/Time    MALBCRT 140 (H) 10/23/2019 08:32 AM    MICROALBUR 11.0 10/23/2019 08:32 AM        Lab Results   Component Value Date/Time    TSHULTRASEN 1.930 11/06/2019 1343     No results found for: FREEDIR  No results found for: FREET3  No results found for: THYSTIMIG        ASSESSMENT/PLAN:     1. Type 2 diabetes mellitus with microalbuminuria, with long-term current use of insulin (HCC)  Uncontrolled secondary to hyperglycemia now with improving blood sugars  Recommend adjusting premix insulin take 50 units of NovoLog 70/30 before breakfast and increase to 60 units of NovoLog 70/30 before supper  Recommend increasing Ozempic to 1 mg once weekly  However to do this we need to get patient assistance form change because she gets his Ozempic through patient assistance  Recommend that he watch his carbohydrate intake and exercise regularly    We will plan for follow-up in 2 to 3 weeks to review his blood sugars    2. Pure hypercholesterolemia  Uncontrolled at baseline  I expect his triglycerides to improve with better glycemic control  Continue atorvastatin and follow low-fat diet  Recommend repeating fasting lipids after 3 months    3. Hypertension associated with diabetes (HCC)  Well-controlled continue lisinopril    4. Long-term insulin use (HCC)  Patient is on long-term insulin therapy for type 2 diabetes      Return in about 2 weeks (around 12/20/2019).      Thank you kindly for allowing me to participate in the diabetes care plan for this patient.    Steve Castellon MD, FACE, [facility]  12/06/19    CC:   Shelbie Samuel M.D.

## 2019-12-06 NOTE — PROGRESS NOTES
Chief Complaint   Patient presents with   • Diabetes     fv labs       HISTORY OF PRESENT ILLNESS: Patient is a 63 y.o. male established patient here today for the following concerns:    1. Type 2 diabetes mellitus with microalbuminuria, with long-term current use of insulin (HCC)  2. Long-term insulin use (HCC)  3. Hypertension associated with diabetes (HCC)  4. Pure hypercholesterolemia    Jose Antonio is a pleasant 63-year-old male accompanied by his wife today for follow-up on his recent lab work.  He had worsening of his glycemic control with an A1c greater than 11.  He was referred to endocrinology and has seen Dr. Sanchez who has thankfully uncovered that Jose Antonio was administering his NovoLog 70/30 insulin after his meals and therefore was getting more hyperglycemia.  He also had them increase the Ozempic to 0.5 weekly.  He reports that he is tolerating this well with no nausea or abdominal pain.  His fasting sugars have still been in the 150s to 170s, no hypoglycemia.  Pre-meal for dinner has been about 130s.  He reports that in the 130s he feels the best with his eyes and overall energy level.  He continues on atorvastatin and lisinopril for primary prevention of heart and kidney disease.  He is tolerating these medications as well and denies any visual changes chest pain numbness or tingling.  No sores.  He is up-to-date on his eye exams.  He does have follow-up with Dr. Sanchez later this afternoon      Past Medical, Social, and Family history reviewed and updated in EPIC    Allergies:Patient has no known allergies.    Current Outpatient Medications   Medication Sig Dispense Refill   • atorvastatin (LIPITOR) 40 MG Tab TAKE 1 TABLET BY MOUTH ONCE DAILY 100 Tab 1   • Insulin Aspart Prot & Aspart (NOVOLOG MIX 70/30 FLEXPEN) (70-30) 100 UNIT/ML Suspension Pen-injector Inject 30-50 Units as instructed 2 Times a Day for 360 days. (Patient taking differently: Inject 50-60 Units as instructed 2 Times a Day.) 10  "PEN 11   • Blood Glucose Test Strips Test strips order: Test strips compatible with meter. Sig: use twice daily and prn ssx high or low sugar #200 RF x 3 200 Each 3   • lisinopril (PRINIVIL) 20 MG Tab Take 1 Tab by mouth every day. 90 Tab 3     No current facility-administered medications for this visit.          ROS:  Review of Systems   Constitutional: Negative for fever, chills, weight loss and malaise/fatigue.   HENT: Negative for ear pain, nosebleeds, congestion, sore throat and neck pain.    Eyes: Negative for blurred vision.   Respiratory: Negative for cough, sputum production, shortness of breath and wheezing.    Cardiovascular: Negative for chest pain, palpitations,  and leg swelling.   Gastrointestinal: Negative for heartburn, nausea, vomiting, diarrhea and abdominal pain.   Genitourinary: Negative for dysuria, urgency and frequency.   Musculoskeletal: Negative for myalgias, back pain and joint pain.   Skin: Negative for rash and itching.   Neurological: Negative for dizziness, tingling, tremors, sensory change, focal weakness and headaches.   Endo/Heme/Allergies: Does not bruise/bleed easily.   Psychiatric/Behavioral: Negative for depression, anxiety, suicidal ideas, insomnia and memory loss.      Exam:  /82 (BP Location: Left arm, Patient Position: Sitting, BP Cuff Size: Adult)   Pulse 94   Temp 36.6 °C (97.8 °F)   Resp 16   Ht 1.702 m (5' 7\")   Wt 92.5 kg (204 lb)   SpO2 92%     General:  Well nourished, well developed in NAD  Head is grossly normal.  Neck: Supple without JVD   Pulmonary:  Normal effort.  Clear to auscultation bilaterally no wheezing rhonchi or rales  Cardiovascular: Regular rate and rhythm no murmurs rubs or gallops  Extremities: no clubbing, cyanosis, or edema.  Psych: affect appropriate      Please note that this dictation was created using voice recognition software. I have made every reasonable attempt to correct obvious errors, but I expect that there are errors of " grammar and possibly content that I did not discover before finalizing the note.    Assessment/Plan:  1. Type 2 diabetes mellitus with microalbuminuria, with long-term current use of insulin (HCC)  2. Long-term insulin use (HCC)  Improving glycemic control.  Most recent labs are being reviewed today which include a very low CCP and a negative anti-giovanna antibodies consistent with his type 2 diabetes and insulin dependence.  Discussed today that likely he will need to stay on insulin as his pancreatic function is not good.  He will continue with the Ozempic.  I will defer to Dr. Sanchez to adjust his 7030 or make additional recommendations.  I have discussed with him today that likely his slice of bread with his coffee at night may be causing some of the morning hyperglycemia.  We discussed maybe changing this to something with a bit more fiber and a slight amount of fat such as a very small apple and some low sugar peanut butter    3. Hypertension associated with diabetes (HCC)  Continue the lisinopril    4. Pure hypercholesterolemia  Continue atorvastatin    Follow-up in 3 to 6 months sooner as needed

## 2019-12-10 ENCOUNTER — PATIENT MESSAGE (OUTPATIENT)
Dept: ENDOCRINOLOGY | Facility: MEDICAL CENTER | Age: 64
End: 2019-12-10

## 2019-12-11 NOTE — TELEPHONE ENCOUNTER
From: Jose Antonio Vann  To: Steve Castellon M.D.  Sent: 12/10/2019 2:12 PM PST  Subject: Prescription Question    Hi,  This email is for Alis Hare, If she can please fill out the attached form and fax to [facility] [phone] with a cover sheet. This is for Madeleine [facility] prescription. Please let me know if you have any questions. I really appreciate your help! Can you please let me     Thanks Tonia  [phone]

## 2019-12-16 NOTE — PATIENT COMMUNICATION
1. Caller Name: Tonia                                             Call Back Number: [phone]        Patient approves a detailed voicemail message: yes    Tonia called asking for Alis. She said she has called and left [employer] messages for patient's [employer] application. I let her know we did receive the messages. I told her I can fill out the application that she attached and have Dr. Castellon sign it on Tuesday 12/17/19. She would like a [employer] message sent once the application has been faxed.     normal...

## 2019-12-19 ENCOUNTER — APPOINTMENT (OUTPATIENT)
Dept: ENDOCRINOLOGY | Facility: MEDICAL CENTER | Age: 64
End: 2019-12-19
Payer: MEDICARE

## 2019-12-20 NOTE — TELEPHONE ENCOUNTER
Was the patient seen in the last year in this department? Yes    Does patient have an active prescription for medications requested? No     Received Request Via: Pharmacy       Last seen

## 2019-12-27 ENCOUNTER — OFFICE VISIT (OUTPATIENT)
Dept: ENDOCRINOLOGY | Facility: MEDICAL CENTER | Age: 64
End: 2019-12-27
Payer: MEDICARE

## 2019-12-27 ENCOUNTER — TELEPHONE (OUTPATIENT)
Dept: ENDOCRINOLOGY | Facility: MEDICAL CENTER | Age: 64
End: 2019-12-27

## 2019-12-27 VITALS
SYSTOLIC BLOOD PRESSURE: 120 MMHG | HEART RATE: 94 BPM | DIASTOLIC BLOOD PRESSURE: 70 MMHG | OXYGEN SATURATION: 95 % | BODY MASS INDEX: 31.86 KG/M2 | HEIGHT: 67 IN | WEIGHT: 203 LBS

## 2019-12-27 DIAGNOSIS — E11.29 TYPE 2 DIABETES MELLITUS WITH MICROALBUMINURIA, WITH LONG-TERM CURRENT USE OF INSULIN (HCC): ICD-10-CM

## 2019-12-27 DIAGNOSIS — R80.9 TYPE 2 DIABETES MELLITUS WITH MICROALBUMINURIA, WITH LONG-TERM CURRENT USE OF INSULIN (HCC): ICD-10-CM

## 2019-12-27 DIAGNOSIS — Z79.4 LONG-TERM INSULIN USE (HCC): ICD-10-CM

## 2019-12-27 DIAGNOSIS — Z79.4 TYPE 2 DIABETES MELLITUS WITH MICROALBUMINURIA, WITH LONG-TERM CURRENT USE OF INSULIN (HCC): ICD-10-CM

## 2019-12-27 DIAGNOSIS — E78.00 PURE HYPERCHOLESTEROLEMIA: ICD-10-CM

## 2019-12-27 DIAGNOSIS — E11.59 HYPERTENSION ASSOCIATED WITH DIABETES (HCC): ICD-10-CM

## 2019-12-27 DIAGNOSIS — I15.2 HYPERTENSION ASSOCIATED WITH DIABETES (HCC): ICD-10-CM

## 2019-12-27 PROCEDURE — 99214 OFFICE O/P EST MOD 30 MIN: CPT | Performed by: INTERNAL MEDICINE

## 2019-12-27 NOTE — PROGRESS NOTES
CHIEF COMPLAINT: Patient is here for follow up of Type 2 Diabetes Mellitus    HPI:     Jose Antonio Vann is a 63 y.o. male with Type 2 Diabetes Mellitus here for follow up.    Labs from 10/23/2019 show HbA1c is elevated at 11.2%    I initially saw him as a consult from Dr. Shelbie Samuel for uncontrolled diabetes at baseline while taking premix NovoLog 70/30 and Ozempic.  We discovered that he was not taking the premixed insulin properly and  He was given education.    Of note he is unable to afford basal bolus therapy and he is getting Ozempic through a patient assistance program which was initiated by his primary care physician.    On follow-up he is now taking NovoLog 70/30 50 units before breakfast and 55 units before supper.  He is also on Ozempic 1 mg weekly.    His blood sugars look better and he is feeling better.  Fasting -180 presupper Bg 100-140.   He reports less hyperglycemia and denies hypoglycemia.    He has hyperlipidemia and is taking atorvastatin and he denies muscle aches and cramps.    He also has essential hypertension and is taking lisinopril and is tolerating this medication pretty well.      BG Diary:  Breakfast: 100, 150, 180 and Dinner: 130, 140, 100    Weight has been stable    Diabetes Complications   Retinopathy: No known retinopathy.  Last eye exam: September 2019 Dr. Lane  Neuropathy: Denies paresthesias or numbness in hands or feet. Denies any foot wounds.  Exercise: Minimal.  Diet: Fair.  Patient's medications, allergies, and social histories were reviewed and updated as appropriate.    ROS:     CONS:     No fever, no chills   EYES:     No diplopia, no blurry vision   CV:           No chest pain, no palpitations   PULM:     No SOB, no cough, no hemoptysis.   GI:            No nausea, no vomiting, no diarrhea, no constipation   ENDO:     No polyuria, no polydipsia, no heat intolerance, no cold intolerance       Past Medical History:  Problem List:  2019-11: Long-term insulin use  "(HCC)  2018-01: Sebaceous cyst  2017-01: Obesity (BMI 35.0-39.9 without comorbidity)  2016-12: Type II diabetes mellitus (HCC)  2016-12: Pure hypercholesterolemia  2016-12: Hypertension associated with diabetes ([facility])      Past Surgical History:  No past surgical history on file.     Allergies:  Patient has no known allergies.     Social History:  Social History     Tobacco Use   • Smoking status: Never Smoker   • Smokeless tobacco: Never Used   Substance Use Topics   • Alcohol use: No     Alcohol/week: 0.0 oz   • Drug use: No        Family History:   family history is not on file.      PHYSICAL EXAM:   OBJECTIVE:  Vital signs: /70   Pulse 94   Ht 1.702 m (5' 7\")   Wt 92.1 kg (203 lb)   SpO2 95%   BMI 31.79 kg/m²   GENERAL: Well-developed, well-nourished in no apparent distress.   EYE:  No ocular asymmetry, PERRLA  HENT: Pink, moist mucous membranes.    NECK: No thyromegaly.   CARDIOVASCULAR:  No murmurs  LUNGS: Clear breath sounds  ABDOMEN: Soft, nontender   EXTREMITIES: No clubbing, cyanosis, or edema.   NEUROLOGICAL: No gross focal motor abnormalities   LYMPH: No cervical adenopathy palpated.   SKIN: No rashes, lesions.     Labs:  Lab Results   Component Value Date/Time    HBA1C 11.2 (H) 10/23/2019 08:35 AM        No results found for: WBC, RBC, HEMOGLOBIN, MCV, MCH, MCHC, RDW, MPV    Lab Results   Component Value Date/Time    SODIUM 141 11/06/2019 01:43 PM    POTASSIUM 4.0 11/06/2019 01:43 PM    CHLORIDE 107 11/06/2019 01:43 PM    CO2 28 11/06/2019 01:43 PM    ANION 6.0 11/06/2019 01:43 PM    GLUCOSE 68 11/06/2019 01:43 PM    BUN 15 11/06/2019 01:43 PM    CREATININE 0.95 11/06/2019 01:43 PM    CALCIUM 8.9 11/06/2019 01:43 PM    ASTSGOT 17 11/06/2019 01:43 PM    ALTSGPT 18 11/06/2019 01:43 PM    TBILIRUBIN 0.5 11/06/2019 01:43 PM    ALBUMIN 4.1 11/06/2019 01:43 PM    TOTPROTEIN 7.0 11/06/2019 01:43 PM    GLOBULIN 2.9 11/06/2019 01:43 PM    AGRATIO 1.4 11/06/2019 01:43 PM       Lab Results   Component " Value Date/Time    CHOLSTRLTOT 194 10/23/2019 0835    TRIGLYCERIDE 234 (H) 10/23/2019 0835    HDL 35 (A) 10/23/2019 0835     (H) 10/23/2019 0835       Lab Results   Component Value Date/Time    MALBCRT 140 (H) 10/23/2019 08:32 AM    MICROALBUR 11.0 10/23/2019 08:32 AM        Lab Results   Component Value Date/Time    TSHULTRASEN 1.930 11/06/2019 1343     No results found for: FREEDIR  No results found for: FREET3  No results found for: THYSTIMIG        ASSESSMENT/PLAN:     1. Type 2 diabetes mellitus with microalbuminuria, with long-term current use of insulin (HCC)  Uncontrolled secondary to hyperglycemia now with improving blood sugars  Recommend adjusting premix insulin take 50 units of NovoLog 70/30 before breakfast and increase to 60 units of NovoLog 70/30 before supper  Continue Ozempic to 1 mg once weekly  Patient assistance forms were signed  Recommend that he watch his carbohydrate intake and exercise regularly    We will plan for follow-up in 1 month with repeat of A1c      2. Pure hypercholesterolemia  Uncontrolled at baseline  I expect his triglycerides to improve with better glycemic control  Continue atorvastatin and follow low-fat diet  We will repeat fasting lipids in 1 month    3. Hypertension associated with diabetes (HCC)  Well-controlled continue lisinopril    4. Long-term insulin use (HCC)  Patient is on long-term insulin therapy for type 2 diabetes      Return in about 4 weeks (around 1/24/2020).      Thank you kindly for allowing me to participate in the diabetes care plan for this patient.    Steve Castellon MD, SHIVANI, [facility]  12/06/19    CC:   Shelbie Samuel M.D.

## 2019-12-28 NOTE — TELEPHONE ENCOUNTER
1. Caller Name: alirio                                         Call Back Number: [phone]      Patient approves a detailed voicemail message: yes    Patient called wanting to let you know  Jose Antonio used TRuemetric glucose monitor

## 2019-12-28 NOTE — TELEPHONE ENCOUNTER
New script for new trumetric meter sent to pharmacy    Please let patient know I sent it to pharmacy on file

## 2019-12-31 NOTE — TELEPHONE ENCOUNTER
Phone Number Called: [phone]      Call outcome: Left voicemail to patient    Message: Contacted patient but there was no answer. I left him a message to notify him that the test strips were sent to the pharmacy on file.

## 2020-01-02 ENCOUNTER — TELEPHONE (OUTPATIENT)
Dept: ENDOCRINOLOGY | Facility: MEDICAL CENTER | Age: 65
End: 2020-01-02

## 2020-01-03 NOTE — TELEPHONE ENCOUNTER
Phone Number Called: [phone] (home)     Call outcome: left message for patient to call back regarding message below    Message: Informed him that his samples from [employer] assistance program have been received and he can come pick those up at any time during normal business hours.

## 2020-08-27 DIAGNOSIS — E78.5 HYPERLIPIDEMIA ASSOCIATED WITH TYPE 2 DIABETES MELLITUS (HCC): ICD-10-CM

## 2020-08-27 DIAGNOSIS — E11.69 HYPERLIPIDEMIA ASSOCIATED WITH TYPE 2 DIABETES MELLITUS (HCC): ICD-10-CM

## 2020-08-27 RX ORDER — ATORVASTATIN CALCIUM 40 MG/1
TABLET, FILM COATED ORAL
Qty: 100 TAB | Refills: 0 | Status: SHIPPED | OUTPATIENT
Start: 2020-08-27

## 2021-03-03 DIAGNOSIS — Z23 NEED FOR VACCINATION: ICD-10-CM

## 2025-01-29 NOTE — ADDENDUM NOTE
Addended by: ALONA MARION on: 12/1/2017 12:00 PM     Modules accepted: Orders    
Detail Level: Generalized
Gentle Skin Care Counseling: I recommended use a gentle skin cleanser when washing the skin. I also recommended application of a moisturizer twice daily. Products with fragrances, preservatives and dyes should be avoided.